# Patient Record
Sex: MALE | Race: WHITE | NOT HISPANIC OR LATINO | Employment: FULL TIME | URBAN - NONMETROPOLITAN AREA
[De-identification: names, ages, dates, MRNs, and addresses within clinical notes are randomized per-mention and may not be internally consistent; named-entity substitution may affect disease eponyms.]

---

## 2018-06-25 ENCOUNTER — OFFICE VISIT (OUTPATIENT)
Dept: FAMILY MEDICINE CLINIC | Facility: CLINIC | Age: 33
End: 2018-06-25

## 2018-06-25 VITALS
WEIGHT: 238 LBS | HEART RATE: 91 BPM | BODY MASS INDEX: 36.07 KG/M2 | DIASTOLIC BLOOD PRESSURE: 90 MMHG | OXYGEN SATURATION: 98 % | TEMPERATURE: 98.6 F | HEIGHT: 68 IN | SYSTOLIC BLOOD PRESSURE: 130 MMHG

## 2018-06-25 DIAGNOSIS — M25.562 CHRONIC PAIN OF BOTH KNEES: ICD-10-CM

## 2018-06-25 DIAGNOSIS — M25.561 CHRONIC PAIN OF BOTH KNEES: ICD-10-CM

## 2018-06-25 DIAGNOSIS — E66.09 CLASS 2 OBESITY DUE TO EXCESS CALORIES WITHOUT SERIOUS COMORBIDITY WITH BODY MASS INDEX (BMI) OF 36.0 TO 36.9 IN ADULT: ICD-10-CM

## 2018-06-25 DIAGNOSIS — G89.29 CHRONIC PAIN OF BOTH KNEES: ICD-10-CM

## 2018-06-25 DIAGNOSIS — Z00.00 ENCOUNTER FOR MEDICAL EXAMINATION TO ESTABLISH CARE: Primary | ICD-10-CM

## 2018-06-25 PROBLEM — E66.812 CLASS 2 OBESITY DUE TO EXCESS CALORIES WITHOUT SERIOUS COMORBIDITY WITH BODY MASS INDEX (BMI) OF 36.0 TO 36.9 IN ADULT: Status: ACTIVE | Noted: 2018-06-25

## 2018-06-25 PROCEDURE — 99203 OFFICE O/P NEW LOW 30 MIN: CPT | Performed by: NURSE PRACTITIONER

## 2018-06-25 RX ORDER — ACETAMINOPHEN 500 MG
500 TABLET ORAL EVERY 6 HOURS PRN
Qty: 90 TABLET | Refills: 5 | OUTPATIENT
Start: 2018-06-25 | End: 2019-03-16

## 2018-06-25 NOTE — PATIENT INSTRUCTIONS
Serving Sizes  A serving size is a measured amount of food or drink, such as one slice of bread, that has an associated nutrient content. Knowing the serving size of a food or drink can help you determine how much of that food you should consume.  What is the size of one serving?  The size of one healthy serving depends on the food or drink. To determine a serving size, read the food label. If the food or drink does not have a food label, try to find serving size information online. Or, use the following to estimate the size of one adult serving:  Grain  1 slice bread. ½ bagel. ½ cup pasta.  Vegetable  ½ cup cooked or canned vegetables. 1 cup raw, leafy greens.  Fruit  ½ cup canned fruit. 1 medium fruit. ¼ cup dried fruit.  Meat and Other Protein Sources  1 oz meat, poultry, or fish. ¼ cup cooked beans. 1 egg. ¼ cup nuts or seeds. 1 Tbsp nut butter. ¼ cup tofu or tempeh. 2 Tbsp hummus.  Dairy  An individual container of yogurt (6-8 oz). 1 piece of cheese the size of your thumb (1 oz). 1 cup (8 oz) milk or milk alternative.  Fat  A piece the size of one dice. 1 tsp soft margarine. 1 Tbsp mayonnaise. 1 tsp vegetable oil. 1 Tbsp regular salad dressing. 2 Tbsp low-fat salad dressing.  How many servings should I eat from each food group each day?  The following are the suggested number of servings to try and have every day from each food group. You can also look at your eating throughout the week and aim for meeting these requirements on most days for overall healthy eating.  Grain  6-8 servings. Try to have half of your grains from whole grains, such as whole wheat bread, corn tortillas, oatmeal, brown rice, whole wheat pasta, and bulgur.  Vegetable  At least 2½-3 servings.  Fruit  2 servings.  Meat and Other Protein Foods  5-6 servings. Aim to have lean proteins, such as chicken, turkey, fish, beans, or tofu.  Dairy  3 servings. Choose low-fat or nonfat if you are trying to control your weight.  Fat  2-3 servings.  Is  "a serving the same thing as a portion?  No. A portion is the actual amount you eat, which may be more than one serving. Knowing the specific serving size of a food and the nutritional information that goes with it can help you make a healthy decision on what size portion to eat.  What are some tips to help me learn healthy serving sizes?  · Check food labels for serving sizes. Many foods that come as a single portion actually contain multiple servings.  · Determine the serving size of foods you commonly eat and figure out how large a portion you usually eat.  · Measure the number of servings that can be held by the bowls, glasses, cups, and plates you typically use. For example, pour your breakfast cereal into your regular bowl and then pour it into a measuring cup.  · For 1-2 days, measure the serving sizes of all the foods you eat.  · Practice estimating serving sizes and determining how big your portions should be.  This information is not intended to replace advice given to you by your health care provider. Make sure you discuss any questions you have with your health care provider.  Document Released: 09/15/2004 Document Revised: 08/12/2017 Document Reviewed: 03/17/2015  [employer] Interactive Patient Education © 2018 [employer]  Eating Healthy on a Budget  There are many ways to save money at the grocery store and continue to eat healthy. You can be successful if you plan your meals according to your budget, purchase according to your budget and grocery list, and prepare food yourself.  How can I buy more food on a limited budget?  Plan  · Plan meals and snacks according to a grocery list and budget you create.  · Look for recipes where you can cook once and make enough food for two meals.  · Include meals that will \"stretch\" more expensive foods such as stews, casseroles, and stir-ny dishes.  · Make a grocery list and make sure to bring it with you to the store. If you have a smart phone, you could use your " "phone to create your shopping list.  Purchase  · When grocery shopping, buy only the items on your grocery list and go only to the areas of the store that have the items on your list.  Prepare  · Some meal items can be prepared in advance. Pre-cook on days when you have extra time.  · Make extra food (such as by doubling recipes) and freeze the extras in meal-sized containers or in individual portions for fast meals and snacks.  · Use leftovers in your meal plan for the week.  · Try some meatless meals or try \"no cook\" meals like salads.  · When you come home from the grocery store, wash and prepare your fruits and vegetables so they are ready to use and eat. This will help reduce food waste.  How can I buy more food on a limited budget?  Try these tips the next time you go shopping:  · Buy store brands or generic brands.  · Use coupons only for foods and brands you normally buy. Avoid buying items you wouldn't normally buy simply because they are on sale.  · Check online and in newspapers for weekly deals.  · Buy healthy items from the bulk bins when available, such as herbs, spices, flours, pastas, nuts, and dried fruit.  · Buy fruits and vegetables that are in season. Prices are usually lower on in-season produce.  · Compare and contrast different items. You can do this by looking at the unit price on the price tag. Use it to compare different brands and sizes to find out which item is the best deal.  · Choose naturally low-cost healthy items, such as carrots, potatoes, apples, bananas, and oranges. Dried or canned beans are a low-cost protein source.  · Buy in bulk and freeze extra food. Items you can buy in bulk include meats, fish, poultry, frozen fruits, and frozen vegetables.  · Limit the purchase of prepared or \"ready-to-eat\" foods, such as pre-cut fruits and vegetables and pre-made salads.  · If possible, shop around to discover which grocery store offers the best prices. Some stores charge much more than " "other stores for the same items.  · Do not shop when you are hungry. If you shop while hungry, It may be hard to stick to your list and budget.  · Stick to your list and resist impulse buys. Treat your list as your official plan for the week.  · Buy a variety of vegetables and fruit by purchasing fresh, frozen, and canned items.  · Look beyond eye level. Foods at eye level (adult or child eye level) are more expensive. Look at the top and bottom shelves for deals.  · Be efficient with your time when shopping. The more time you spend at the store, the more money you are likely to spend.  · Consider other retailers such as dollar stores, larger wholesale stores, local fruit and vegetable stands, and farmers markets.    What are some tips for less expensive food substitutions?  When choosing more expensive foods like meats and dairy, try these tips to save money:  · Choose cheaper cuts of meat, such as bone-in chicken thighs and drumsticks instead skinless and boneless chicken. When you are ready to prepare the chicken, you can remove the skin yourself to make it healthier.  · Choose lean meats like chicken or turkey. When choosing ground beef, make sure it is lean ground beef (92% lean, 8% fat). If you do buy a fattier ground beef, drain the fat before eating.  · Buy dried beans and peas, such as lentils, split peas, or kidney beans.  · For seafood, choose canned tuna, salmon, or sardines.  · Eggs are a low-cost source of protein.  · Buy the larger tubs of yogurt instead of individual-sized containers.  · Choose water instead of sodas and other sweetened beverages.  · Skip buying chips, cookies, and other \"junk food\". These items are usually expensive, high in calories, and low in nutritional value.    How can I prepare the foods I buy in the healthiest way?  Practice these tips for cooking foods in the healthiest way to reduce excess fat and calorie intake:  · Steam, saute, grill, or bake foods instead of frying " them.  · Make sure half your plate is filled with fruits or vegetables. Choose from fresh, frozen, or canned fruits and vegetables. If eating canned, remember to rinse them before eating. This will remove any excess salt added for packaging.  · Trim all fat from meat before cooking. Remove the skin from chicken or turkey.  · Spoon off fat from meat dishes once they have been chilled in the refrigerator and the fat has hardened on the top.  · Use skim milk, low-fat milk, or evaporated skim milk when making cream sauces, soups, or puddings.  · Substitute low-fat yogurt, sour cream, or cottage cheese for sour cream and mayonnaise in dips and dressings.  · Try lemon juice, herbs, or spices to season food instead of salt, butter, or margarine.    This information is not intended to replace advice given to you by your health care provider. Make sure you discuss any questions you have with your health care provider.  Document Released: 08/21/2015 Document Revised: 07/07/2017 Document Reviewed: 07/21/2015  [employer] Interactive Patient Education © 2018 [employer]  Steps to Quit Smoking  Smoking tobacco can be harmful to your health and can affect almost every organ in your body. Smoking puts you, and those around you, at risk for developing many serious chronic diseases. Quitting smoking is difficult, but it is one of the best things that you can do for your health. It is never too late to quit.  What are the benefits of quitting smoking?  When you quit smoking, you lower your risk of developing serious diseases and conditions, such as:  · Lung cancer or lung disease, such as COPD.  · Heart disease.  · Stroke.  · Heart attack.  · Infertility.  · Osteoporosis and bone fractures.    Additionally, symptoms such as coughing, wheezing, and shortness of breath may get better when you quit. You may also find that you get sick less often because your body is stronger at fighting off colds and infections. If you are pregnant,  quitting smoking can help to reduce your chances of having a baby of low birth weight.  How do I get ready to quit?  When you decide to quit smoking, create a plan to make sure that you are successful. Before you quit:  · Pick a date to quit. Set a date within the next two weeks to give you time to prepare.  · Write down the reasons why you are quitting. Keep this list in places where you will see it often, such as on your bathroom mirror or in your car or wallet.  · Identify the people, places, things, and activities that make you want to smoke (triggers) and avoid them. Make sure to take these actions:  ? Throw away all cigarettes at home, at work, and in your car.  ? Throw away smoking accessories, such as ashtrays and lighters.  ? Clean your car and make sure to empty the ashtray.  ? Clean your home, including curtains and carpets.  · Tell your family, friends, and coworkers that you are quitting. Support from your loved ones can make quitting easier.  · Talk with your health care provider about your options for quitting smoking.  · Find out what treatment options are covered by your health insurance.    What strategies can I use to quit smoking?  Talk with your healthcare provider about different strategies to quit smoking. Some strategies include:  · Quitting smoking altogether instead of gradually lessening how much you smoke over a period of time. Research shows that quitting “cold turkey” is more successful than gradually quitting.  · Attending in-person counseling to help you build problem-solving skills. You are more likely to have success in quitting if you attend several counseling sessions. Even short sessions of 10 minutes can be effective.  · Finding resources and support systems that can help you to quit smoking and remain smoke-free after you quit. These resources are most helpful when you use them often. They can include:  ? Online chats with a counselor.  ? Telephone quitlines.  ? Printed  self-help materials.  ? Support groups or group counseling.  ? Text messaging programs.  ? Mobile phone applications.  · Taking medicines to help you quit smoking. (If you are pregnant or breastfeeding, talk with your health care provider first.) Some medicines contain nicotine and some do not. Both types of medicines help with cravings, but the medicines that include nicotine help to relieve withdrawal symptoms. Your health care provider may recommend:  ? Nicotine patches, gum, or lozenges.  ? Nicotine inhalers or sprays.  ? Non-nicotine medicine that is taken by mouth.    Talk with your health care provider about combining strategies, such as taking medicines while you are also receiving in-person counseling. Using these two strategies together makes you more likely to succeed in quitting than if you used either strategy on its own.  If you are pregnant or breastfeeding, talk with your health care provider about finding counseling or other support strategies to quit smoking. Do not take medicine to help you quit smoking unless told to do so by your health care provider.  What things can I do to make it easier to quit?  Quitting smoking might feel overwhelming at first, but there is a lot that you can do to make it easier. Take these important actions:  · Reach out to your family and friends and ask that they support and encourage you during this time. Call telephone quitlines, reach out to support groups, or work with a counselor for support.  · Ask people who smoke to avoid smoking around you.  · Avoid places that trigger you to smoke, such as bars, parties, or smoke-break areas at work.  · Spend time around people who do not smoke.  · Lessen stress in your life, because stress can be a smoking trigger for some people. To lessen stress, try:  ? Exercising regularly.  ? Deep-breathing exercises.  ? Yoga.  ? Meditating.  ? Performing a body scan. This involves closing your eyes, scanning your body from head to toe,  and noticing which parts of your body are particularly tense. Purposefully relax the muscles in those areas.  · Download or purchase mobile phone or tablet apps (applications) that can help you stick to your quit plan by providing reminders, tips, and encouragement. There are many free apps, such as QuitGuide from the CDC (Centers for Disease Control and Prevention). You can find other support for quitting smoking (smoking cessation) through smokefree.gov and other websites.    How will I feel when I quit smoking?  Within the first 24 hours of quitting smoking, you may start to feel some withdrawal symptoms. These symptoms are usually most noticeable 2-3 days after quitting, but they usually do not last beyond 2-3 weeks. Changes or symptoms that you might experience include:  · Mood swings.  · Restlessness, anxiety, or irritation.  · Difficulty concentrating.  · Dizziness.  · Strong cravings for sugary foods in addition to nicotine.  · Mild weight gain.  · Constipation.  · Nausea.  · Coughing or a sore throat.  · Changes in how your medicines work in your body.  · A depressed mood.  · Difficulty sleeping (insomnia).    After the first 2-3 weeks of quitting, you may start to notice more positive results, such as:  · Improved sense of smell and taste.  · Decreased coughing and sore throat.  · Slower heart rate.  · Lower blood pressure.  · Clearer skin.  · The ability to breathe more easily.  · Fewer sick days.    Quitting smoking is very challenging for most people. Do not get discouraged if you are not successful the first time. Some people need to make many attempts to quit before they achieve long-term success. Do your best to stick to your quit plan, and talk with your health care provider if you have any questions or concerns.  This information is not intended to replace advice given to you by your health care provider. Make sure you discuss any questions you have with your health care provider.  Document  Released: 12/12/2002 Document Revised: 08/15/2017 Document Reviewed: 05/03/2016  [employer] Interactive Patient Education © 2018 [employer]

## 2018-06-25 NOTE — PROGRESS NOTES
"Cristine Mar is a 32 y.o. male.     Chief Complaint   Patient presents with   • Knee Pain       History of Present Illness     Establish Care-Here to establish primary care. No recent primary care.    Heart attack-in 2009.  Was taken care of at [facility].  Reports was \"mild\".  He was followed by Cardio for a short time but has not had any recent care.  \"slight damage to backside of heart\".    Knee pain-Intermittent knee edema and some subluxation of patella bilaterally.  Sitting or standing for prolonged periods are uncomfortable.  He reports he has not noted any rubbing of his joint.  He reports he played foot ball for multiple years and baseball.  Last imaging was \"senior in high school\".  He reports he does report \"blew out the side of my knee\" on the right.  Does take PRN Tylenol.  He has not worn braces for support.  He does use heat and ice PRN.  He does try gentle stretching.  Ongoing.     The following portions of the patient's history were reviewed and updated as appropriate: allergies, current medications, past family history, past medical history, past social history, past surgical history and problem list.    Review of Systems   Constitutional: Negative for appetite change, fatigue and unexpected weight change.   HENT: Positive for congestion. Negative for ear pain, nosebleeds, postnasal drip, rhinorrhea, sore throat, trouble swallowing and voice change.    Eyes: Negative for pain and visual disturbance.   Respiratory: Negative for cough, shortness of breath and wheezing.    Cardiovascular: Negative for chest pain, palpitations and leg swelling.   Gastrointestinal: Negative for abdominal pain, blood in stool, constipation and diarrhea.   Endocrine: Negative for cold intolerance and polydipsia.   Genitourinary: Negative for difficulty urinating, flank pain and hematuria.   Musculoskeletal: Positive for arthralgias and joint swelling (knees with acute flares). Negative for back " "pain, gait problem and myalgias.   Skin: Negative for color change and rash.   Allergic/Immunologic: Negative.    Neurological: Negative for dizziness, syncope, numbness and headaches.   Hematological: Negative.    Psychiatric/Behavioral: Negative for sleep disturbance and suicidal ideas.   All other systems reviewed and are negative.      Objective     /90   Pulse 91   Temp 98.6 °F (37 °C) (Temporal Artery )   Ht 172.7 cm (68\")   Wt 108 kg (238 lb)   SpO2 98%   BMI 36.19 kg/m²     Physical Exam   Constitutional: He is oriented to person, place, and time. He appears well-developed and well-nourished. No distress.   HENT:   Head: Normocephalic and atraumatic.   Right Ear: Hearing, tympanic membrane, external ear and ear canal normal.   Left Ear: Hearing, tympanic membrane, external ear and ear canal normal.   Nose: No mucosal edema or rhinorrhea.   Mouth/Throat: Oropharynx is clear and moist and mucous membranes are normal.   Eyes: Conjunctivae, EOM and lids are normal. Pupils are equal, round, and reactive to light. No scleral icterus. Right eye exhibits normal extraocular motion and no nystagmus. Left eye exhibits normal extraocular motion and no nystagmus.   Neck: Normal range of motion. Neck supple. No JVD present. No tracheal tenderness present. Carotid bruit is not present. No thyromegaly present.   Cardiovascular: Normal rate, regular rhythm, S1 normal, S2 normal, normal heart sounds and intact distal pulses.    No murmur heard.  Pulmonary/Chest: Effort normal and breath sounds normal. He exhibits no tenderness.   Abdominal: Soft. Bowel sounds are normal. He exhibits no mass. There is no hepatosplenomegaly. There is no tenderness.   Musculoskeletal: Normal range of motion. He exhibits no edema.        Right knee: He exhibits bony tenderness. Tenderness found. Patellar tendon tenderness noted.        Left knee: He exhibits bony tenderness. Tenderness found. Patellar tendon tenderness noted.   Gait " normal.   equal bilaterally. No muscular atrophy or flaccidity.   Lymphadenopathy:     He has no cervical adenopathy.        Right cervical: No superficial cervical adenopathy present.       Left cervical: No superficial cervical adenopathy present.   Neurological: He is alert and oriented to person, place, and time. He has normal strength and normal reflexes. He displays no tremor. No cranial nerve deficit or sensory deficit. He exhibits normal muscle tone. Coordination and gait normal.   Skin: Skin is warm and dry. Capillary refill takes less than 2 seconds. He is not diaphoretic. No cyanosis. No pallor. Nails show no clubbing.   various tattoos   Psychiatric: He has a normal mood and affect. His speech is normal and behavior is normal. Judgment and thought content normal. He is not actively hallucinating. Cognition and memory are normal. He is attentive.   Vitals reviewed.    Assessment/Plan     Problem List Items Addressed This Visit        Digestive    Class 2 obesity due to excess calories without serious comorbidity with body mass index (BMI) of 36.0 to 36.9 in adult       Musculoskeletal and Integument    Chronic pain of both knees    Relevant Medications    acetaminophen (TYLENOL) 500 MG tablet    Other Relevant Orders    XR Knee 3 View Bilateral    Hinged Knee Brace- Right and Left      Other Visit Diagnoses     Encounter for medical examination to establish care    -  Primary    H & P obtained          Patient's Body mass index is 36.19 kg/m². BMI is above normal parameters. Recommendations include: nutrition counseling.    Normal BMI:  18.5-24.9, Overweight 25-29.9, Obesity 30 or greater  I advised León of the risks of continuing to use tobacco, and I provided him with tobacco cessation educational materials in the After Visit Summary.     During this visit, I spent 2-3 minutes counseling the patient regarding tobacco cessation.  Will request his past records from [facility].  Continue  Tylenol as ordered for his joint pain.    RTC 1 month, sooner if needed.           This document has been electronically signed by:  ARTUR Brothers, LILIANA

## 2018-07-03 ENCOUNTER — HOSPITAL ENCOUNTER (OUTPATIENT)
Dept: GENERAL RADIOLOGY | Facility: HOSPITAL | Age: 33
Discharge: HOME | End: 2018-07-03

## 2018-07-03 DIAGNOSIS — M25.562 CHRONIC PAIN OF BOTH KNEES: ICD-10-CM

## 2018-07-03 DIAGNOSIS — M25.561 CHRONIC PAIN OF BOTH KNEES: ICD-10-CM

## 2018-07-03 DIAGNOSIS — G89.29 CHRONIC PAIN OF BOTH KNEES: ICD-10-CM

## 2018-08-28 ENCOUNTER — OFFICE VISIT (OUTPATIENT)
Dept: FAMILY MEDICINE CLINIC | Facility: CLINIC | Age: 33
End: 2018-08-28

## 2018-08-28 VITALS
BODY MASS INDEX: 35.74 KG/M2 | HEART RATE: 115 BPM | OXYGEN SATURATION: 98 % | HEIGHT: 68 IN | SYSTOLIC BLOOD PRESSURE: 130 MMHG | WEIGHT: 235.8 LBS | DIASTOLIC BLOOD PRESSURE: 90 MMHG

## 2018-08-28 DIAGNOSIS — M25.561 CHRONIC PAIN OF BOTH KNEES: Primary | ICD-10-CM

## 2018-08-28 DIAGNOSIS — M25.562 CHRONIC PAIN OF BOTH KNEES: Primary | ICD-10-CM

## 2018-08-28 DIAGNOSIS — G89.29 CHRONIC PAIN OF BOTH KNEES: Primary | ICD-10-CM

## 2018-08-28 PROCEDURE — 99213 OFFICE O/P EST LOW 20 MIN: CPT | Performed by: NURSE PRACTITIONER

## 2019-02-15 ENCOUNTER — OFFICE VISIT (OUTPATIENT)
Dept: FAMILY MEDICINE CLINIC | Facility: CLINIC | Age: 34
End: 2019-02-15

## 2019-02-15 VITALS
HEART RATE: 99 BPM | BODY MASS INDEX: 36.22 KG/M2 | HEIGHT: 68 IN | DIASTOLIC BLOOD PRESSURE: 76 MMHG | SYSTOLIC BLOOD PRESSURE: 124 MMHG | OXYGEN SATURATION: 97 % | WEIGHT: 239 LBS | TEMPERATURE: 98.1 F

## 2019-02-15 DIAGNOSIS — J40 BRONCHITIS: ICD-10-CM

## 2019-02-15 DIAGNOSIS — R50.81 FEVER IN OTHER DISEASES: Primary | ICD-10-CM

## 2019-02-15 LAB
EXPIRATION DATE: NORMAL
FLUAV AG NPH QL: NEGATIVE
FLUBV AG NPH QL: NEGATIVE
INTERNAL CONTROL: NORMAL
Lab: NORMAL

## 2019-02-15 PROCEDURE — 87804 INFLUENZA ASSAY W/OPTIC: CPT | Performed by: PHYSICIAN ASSISTANT

## 2019-02-15 PROCEDURE — 99213 OFFICE O/P EST LOW 20 MIN: CPT | Performed by: PHYSICIAN ASSISTANT

## 2019-02-15 RX ORDER — AZITHROMYCIN 250 MG/1
TABLET, FILM COATED ORAL
Qty: 6 TABLET | Refills: 0 | OUTPATIENT
Start: 2019-02-15 | End: 2019-03-16

## 2019-02-18 NOTE — PROGRESS NOTES
"Cristine Mar is a 33 y.o. male.       Chief Complaint -  fever    History of Present Illness -       Fever-  He complains of fever 100.9 degrees Fahrenheit, productive cough, and fatigue for the past 3 days.  Minimal relief with Tylenol.    Influenza testing today was negative in the office    The following portions of the patient's history were reviewed and updated as appropriate: allergies, current medications, past family history, past medical history, past social history, past surgical history and problem list.    Review of Systems   Constitutional: Positive for fatigue and fever. Negative for activity change and appetite change.   HENT: Negative for ear pain, sinus pressure and sore throat.    Eyes: Negative for pain and visual disturbance.   Respiratory: Positive for cough. Negative for chest tightness.    Cardiovascular: Negative for chest pain and palpitations.   Gastrointestinal: Negative for abdominal pain, constipation, diarrhea, nausea and vomiting.   Endocrine: Negative for polydipsia and polyuria.   Genitourinary: Negative for dysuria and frequency.   Musculoskeletal: Negative for back pain and myalgias.   Skin: Negative for color change and rash.   Allergic/Immunologic: Negative for food allergies and immunocompromised state.   Neurological: Negative for dizziness, syncope and headaches.   Hematological: Negative for adenopathy. Does not bruise/bleed easily.   Psychiatric/Behavioral: Negative for hallucinations and suicidal ideas. The patient is not nervous/anxious.        /76   Pulse 99   Temp 98.1 °F (36.7 °C) (Oral)   Ht 172.7 cm (67.99\")   Wt 108 kg (239 lb)   SpO2 97%   BMI 36.35 kg/m²     Physical Exam   Constitutional: He is oriented to person, place, and time. He appears well-developed and well-nourished. No distress.   HENT:   Head: Normocephalic and atraumatic.   Right Ear: Tympanic membrane, external ear and ear canal normal.   Left Ear: Tympanic membrane, external " ear and ear canal normal.   Mouth/Throat: Oropharynx is clear and moist.   Neck: Normal range of motion. Neck supple. No thyromegaly present.   Cardiovascular: Normal rate and regular rhythm.   No murmur heard.  Pulmonary/Chest: Effort normal and breath sounds normal. He has no wheezes. He has no rales.   Neurological: He is alert and oriented to person, place, and time.   Skin: Skin is warm and dry.   Psychiatric: He has a normal mood and affect. His behavior is normal.       Assessment/Plan     Diagnoses and all orders for this visit:    Fever in other diseases  -     POCT Influenza A/B    Bronchitis  -     azithromycin (ZITHROMAX) 250 MG tablet; Take 2 tablets the first day, then 1 tablet daily for 4 days.      Symptomatic care also advised.  He was advised to return to clinic if symptoms persist or worsen.           This document has been electronically signed by:  Nafisa No PA-C

## 2019-03-16 ENCOUNTER — HOSPITAL ENCOUNTER (EMERGENCY)
Facility: HOSPITAL | Age: 34
Discharge: HOME | End: 2019-03-16
Attending: FAMILY MEDICINE | Admitting: FAMILY MEDICINE

## 2019-03-16 VITALS
TEMPERATURE: 98.1 F | DIASTOLIC BLOOD PRESSURE: 76 MMHG | SYSTOLIC BLOOD PRESSURE: 128 MMHG | WEIGHT: 230 LBS | RESPIRATION RATE: 18 BRPM | HEIGHT: 67 IN | BODY MASS INDEX: 36.1 KG/M2 | OXYGEN SATURATION: 97 % | HEART RATE: 87 BPM

## 2019-03-16 DIAGNOSIS — B34.9 VIRAL SYNDROME: Primary | ICD-10-CM

## 2019-03-16 LAB
FLUAV AG NPH QL: NEGATIVE
FLUBV AG NPH QL IA: NEGATIVE

## 2019-03-16 PROCEDURE — 87804 INFLUENZA ASSAY W/OPTIC: CPT | Performed by: FAMILY MEDICINE

## 2019-03-16 PROCEDURE — 99283 EMERGENCY DEPT VISIT LOW MDM: CPT

## 2019-03-16 NOTE — ED PROVIDER NOTES
Subjective     History provided by:  Patient   used: No    Flu Symptoms   Presenting symptoms: fever, headache and myalgias    Severity:  Moderate  Onset quality:  Sudden  Duration:  2 hours  Progression:  Worsening  Chronicity:  New  Relieved by:  Nothing  Worsened by:  Nothing  Ineffective treatments:  None tried  Associated symptoms: nasal congestion    Risk factors comment:  Exposure to influenza      Review of Systems   Constitutional: Positive for fever.   HENT: Positive for congestion.    Eyes: Negative.    Respiratory: Negative.    Cardiovascular: Negative.  Negative for leg swelling.   Gastrointestinal: Negative.  Negative for abdominal distention.   Endocrine: Negative.    Genitourinary: Negative.    Musculoskeletal: Positive for myalgias.   Skin: Negative.    Allergic/Immunologic: Negative.    Neurological: Positive for headaches.   Hematological: Negative.    Psychiatric/Behavioral: Negative.    All other systems reviewed and are negative.      Past Medical History:   Diagnosis Date   • Heart attack (CMS/[facility]) 2009       Allergies   Allergen Reactions   • Strawberry Extract Anaphylaxis       No past surgical history on file.    No family history on file.    Social History     Socioeconomic History   • Marital status: Single     Spouse name: Not on file   • Number of children: 1   • Years of education: 12   • Highest education level: Not on file   Social Needs   • Financial resource strain: Somewhat hard   • Food insecurity - worry: Never true   • Food insecurity - inability: Never true   • Transportation needs - medical: No   • Transportation needs - non-medical: No   Occupational History   • Occupation: [employer]   Tobacco Use   • Smoking status: Current Every Day Smoker     Packs/day: 0.50     Types: Cigarettes   • Smokeless tobacco: Never Used   Substance and Sexual Activity   • Alcohol use: No   • Drug use: No   • Sexual activity: Yes     Partners: Female            Objective   Physical Exam   Constitutional: He is oriented to person, place, and time. He appears well-developed and well-nourished.   HENT:   Head: Normocephalic and atraumatic.   Eyes: EOM are normal. Pupils are equal, round, and reactive to light.   Neck: Normal range of motion. Neck supple.   Cardiovascular: Normal rate, regular rhythm, normal heart sounds and intact distal pulses.   Pulmonary/Chest: Effort normal and breath sounds normal.   Abdominal: Soft. Bowel sounds are normal.   Musculoskeletal: Normal range of motion.   Neurological: He is alert and oriented to person, place, and time.   Skin: Skin is warm and dry. Capillary refill takes less than 2 seconds.   Psychiatric: He has a normal mood and affect. His behavior is normal. Judgment and thought content normal.   Nursing note and vitals reviewed.      Procedures           ED Course                  MDM  Number of Diagnoses or Management Options  Viral syndrome: new and requires workup     Amount and/or Complexity of Data Reviewed  Clinical lab tests: ordered and reviewed  Tests in the medicine section of CPT®: reviewed and ordered    Risk of Complications, Morbidity, and/or Mortality  Presenting problems: moderate  Diagnostic procedures: moderate  Management options: moderate    Patient Progress  Patient progress: improved        Final diagnoses:   Viral syndrome            Linsey Stone, APRN  03/16/19 0449

## 2019-05-29 ENCOUNTER — CLINICAL SUPPORT (OUTPATIENT)
Dept: FAMILY MEDICINE CLINIC | Facility: CLINIC | Age: 34
End: 2019-05-29

## 2019-05-29 PROCEDURE — 90471 IMMUNIZATION ADMIN: CPT | Performed by: PHYSICIAN ASSISTANT

## 2019-05-29 PROCEDURE — 90632 HEPA VACCINE ADULT IM: CPT | Performed by: PHYSICIAN ASSISTANT

## 2019-12-10 ENCOUNTER — HOSPITAL ENCOUNTER (OUTPATIENT)
Dept: CT IMAGING | Facility: HOSPITAL | Age: 34
Discharge: HOME | End: 2019-12-10
Admitting: NURSE PRACTITIONER

## 2019-12-10 ENCOUNTER — OFFICE VISIT (OUTPATIENT)
Dept: FAMILY MEDICINE CLINIC | Facility: CLINIC | Age: 34
End: 2019-12-10

## 2019-12-10 VITALS
SYSTOLIC BLOOD PRESSURE: 142 MMHG | BODY MASS INDEX: 38.77 KG/M2 | TEMPERATURE: 97.8 F | WEIGHT: 247 LBS | HEIGHT: 67 IN | HEART RATE: 106 BPM | OXYGEN SATURATION: 92 % | DIASTOLIC BLOOD PRESSURE: 82 MMHG

## 2019-12-10 DIAGNOSIS — R11.2 NAUSEA AND VOMITING IN ADULT PATIENT: ICD-10-CM

## 2019-12-10 DIAGNOSIS — R10.31 ABDOMINAL PAIN, RLQ: Primary | ICD-10-CM

## 2019-12-10 PROCEDURE — 74176 CT ABD & PELVIS W/O CONTRAST: CPT | Performed by: RADIOLOGY

## 2019-12-10 PROCEDURE — 74176 CT ABD & PELVIS W/O CONTRAST: CPT

## 2019-12-10 PROCEDURE — 99213 OFFICE O/P EST LOW 20 MIN: CPT | Performed by: NURSE PRACTITIONER

## 2019-12-10 RX ORDER — ONDANSETRON 4 MG/1
4 TABLET, FILM COATED ORAL EVERY 8 HOURS PRN
Qty: 30 TABLET | Refills: 1 | Status: SHIPPED | OUTPATIENT
Start: 2019-12-10 | End: 2020-01-29

## 2019-12-10 RX ORDER — PROMETHAZINE HYDROCHLORIDE 25 MG/1
25 TABLET ORAL EVERY 6 HOURS PRN
Qty: 30 TABLET | Refills: 1 | Status: SHIPPED | OUTPATIENT
Start: 2019-12-10 | End: 2020-01-28

## 2019-12-10 NOTE — PROGRESS NOTES
Please let him know he does not have appendicitis.  His gallbladder actually looks normal on this scan.  He is noted to have some mild constipation and a small right inguinal hernia which could be causing some of his pain.

## 2019-12-10 NOTE — PROGRESS NOTES
"Cristine Mar is a 34 y.o. male.     Chief Complaint   Patient presents with   • Vomiting   • Diarrhea   • Abdominal Pain       History of Present Illness     GI complaint-patient is here today for acute abdominal pain, nausea, vomiting, and diarrhea.  He reports symptoms have been present for 3 to 4 days.  He reports he has been seen at urgent care last week.  Pain is present in his RLQ.  He reports \"hurts really bad\".  He reports any jarring of his abdomen is painful.  He reports \"green frothy vomit\" that \"smells horrible\".  He reports he did have a fever on Friday when he went to urgent care of \"102\" with elevation of BP.  He reports walking hurts and sitting with pressure on his abdomen hurts.      The following portions of the patient's history were reviewed and updated as appropriate: CC, ROS, allergies, current medications, past family history, past medical history, past social history, past surgical history and problem list.    Review of Systems   Constitutional: Positive for appetite change and fatigue. Negative for unexpected weight change.   HENT: Positive for congestion, sinus pressure, sinus pain and sore throat. Negative for ear pain, nosebleeds, postnasal drip, rhinorrhea, trouble swallowing and voice change.    Eyes: Negative for pain and visual disturbance.   Respiratory: Positive for cough. Negative for shortness of breath and wheezing.    Cardiovascular: Negative for chest pain and palpitations.   Gastrointestinal: Positive for abdominal pain, diarrhea, nausea and vomiting. Negative for blood in stool and constipation.   Endocrine: Negative for cold intolerance and polydipsia.   Genitourinary: Negative for difficulty urinating, flank pain and hematuria.   Musculoskeletal: Negative for arthralgias, back pain, gait problem, joint swelling and myalgias.   Skin: Negative for color change and rash.   Allergic/Immunologic: Negative.    Neurological: Positive for dizziness and headaches. " "Negative for syncope and numbness.   Hematological: Negative.    Psychiatric/Behavioral: Negative for sleep disturbance and suicidal ideas.   All other systems reviewed and are negative.      Objective     /82   Pulse 106   Temp 97.8 °F (36.6 °C) (Temporal)   Ht 170.2 cm (67\")   Wt 112 kg (247 lb)   SpO2 92%   BMI 38.69 kg/m²     Physical Exam   Constitutional: He is oriented to person, place, and time. He appears well-developed and well-nourished. No distress.   HENT:   Head: Normocephalic and atraumatic.   Right Ear: External ear normal.   Left Ear: External ear normal.   Nose: Nose normal.   Mouth/Throat: Oropharynx is clear and moist.   Eyes: Pupils are equal, round, and reactive to light. EOM are normal. No scleral icterus.   Neck: Normal range of motion. Neck supple. No JVD present. No thyromegaly present.   Cardiovascular: Normal rate, regular rhythm and normal heart sounds.   Pulmonary/Chest: Effort normal and breath sounds normal.   Abdominal: Soft. Bowel sounds are normal. There is no hepatosplenomegaly. There is tenderness in the right lower quadrant. There is no CVA tenderness.   Positive psoas sign and right heel jar   Neurological: He is alert and oriented to person, place, and time. No cranial nerve deficit. Coordination normal.   Skin: Skin is warm and dry.   Psychiatric: He has a normal mood and affect. His behavior is normal. Judgment and thought content normal.   Vitals reviewed.      Assessment/Plan     Problem List Items Addressed This Visit     None      Visit Diagnoses     Abdominal pain, RLQ    -  Primary    Relevant Orders    CT Abdomen Pelvis Without Contrast    Nausea and vomiting in adult patient        Relevant Medications    ondansetron (ZOFRAN) 4 MG tablet    promethazine (PHENERGAN) 25 MG tablet          Patient's Body mass index is 38.69 kg/m². BMI is above normal parameters. Recommendations include: nutrition counseling.     Emotional support and active listening " provided.  Patient provided time to verbalize feelings.  Understands disease processes and need for medications.  Understands reasons for urgent and emergent care.  Patient (& family) verbalized agreement for treatment plan    Stat CT ordered of abdomen and pelvis.  Approved with insurance and patient sent directly to the hospital to have scan done.  PRN meds for nausea.    RTC 3 to 5 days, sooner if needed for problems or concerns          This document has been electronically signed by:  ARTUR Brothers, FNP-C    Dragon disclaimer:  Much of this encounter note is an electronic transcription/translation of spoken language to printed text. The electronic translation of spoken language may permit erroneous, or at times, nonsensical words or phrases to be inadvertently transcribed; Although I have reviewed the note for such errors, some may still exist.

## 2020-01-21 ENCOUNTER — OFFICE VISIT (OUTPATIENT)
Dept: FAMILY MEDICINE CLINIC | Facility: CLINIC | Age: 35
End: 2020-01-21

## 2020-01-21 VITALS
BODY MASS INDEX: 38.77 KG/M2 | HEART RATE: 100 BPM | DIASTOLIC BLOOD PRESSURE: 80 MMHG | TEMPERATURE: 98 F | OXYGEN SATURATION: 98 % | HEIGHT: 67 IN | WEIGHT: 247 LBS | SYSTOLIC BLOOD PRESSURE: 130 MMHG

## 2020-01-21 DIAGNOSIS — Z30.09 VASECTOMY EVALUATION: Primary | ICD-10-CM

## 2020-01-21 DIAGNOSIS — M25.561 CHRONIC PAIN OF BOTH KNEES: ICD-10-CM

## 2020-01-21 DIAGNOSIS — M25.562 CHRONIC PAIN OF BOTH KNEES: ICD-10-CM

## 2020-01-21 DIAGNOSIS — E66.09 CLASS 2 OBESITY DUE TO EXCESS CALORIES WITHOUT SERIOUS COMORBIDITY WITH BODY MASS INDEX (BMI) OF 38.0 TO 38.9 IN ADULT: ICD-10-CM

## 2020-01-21 DIAGNOSIS — G89.29 CHRONIC PAIN OF BOTH KNEES: ICD-10-CM

## 2020-01-21 PROCEDURE — 99214 OFFICE O/P EST MOD 30 MIN: CPT | Performed by: NURSE PRACTITIONER

## 2020-01-21 NOTE — ASSESSMENT & PLAN NOTE
Avoid overuse activities such as walking prolonged distances or climbing steps.  Braces PRN for support.  PRN heat and ice

## 2020-01-21 NOTE — PROGRESS NOTES
"Cristine Mar is a 34 y.o. male.     Chief Complaint   Patient presents with   • Referral for vasectomy       History of Present Illness     Urology referral-here for a referral for vasectomy referral.  His significant other has recently had a baby girl and \"I have 3 kids\".  The plan was for his SO to have BTL but she has so much \"scar tissue\" that she is a poor surgical candidate.  She will have to have another appt for internal healing check upcoming.  He has attempted to make an appt but \"needed a referral\".    Knee pain-chronic and ongoing.  More painful after standing or being at work.  He has braces but does not wear them daily as they get to hot and 'stink\".  He has grinding sensation in his knees.   He has not been evaluated for synvis.  Not at goal  Fatty liver and gastritis-found per GI.  He reports he is presently undergoing a GB work up.  He will be having a HIDA scan.  He is presently under the care of Dr Silverio at [facility].  He has been advised to work on weight reduction.  Ongoing.      The following portions of the patient's history were reviewed and updated as appropriate: CC, ROS, allergies, current medications, past family history, past medical history, past social history, past surgical history and problem list.      Review of Systems   Constitutional: Negative for appetite change, fatigue and unexpected weight change.   HENT: Negative for congestion, ear pain, nosebleeds, postnasal drip, rhinorrhea, sore throat, trouble swallowing and voice change.    Eyes: Negative for pain and visual disturbance.   Respiratory: Negative for cough, shortness of breath and wheezing.    Cardiovascular: Negative for chest pain and palpitations.   Gastrointestinal: Negative for abdominal pain, blood in stool, constipation and diarrhea.   Endocrine: Negative for cold intolerance and polydipsia.   Genitourinary: Negative for difficulty urinating, flank pain and hematuria.   Musculoskeletal: Negative for " "arthralgias, back pain, gait problem, joint swelling and myalgias.   Skin: Negative for color change and rash.   Allergic/Immunologic: Negative.    Neurological: Negative for syncope, numbness and headaches.   Hematological: Negative.    Psychiatric/Behavioral: Negative for sleep disturbance and suicidal ideas.   All other systems reviewed and are negative.      Objective     /80   Pulse 100   Temp 98 °F (36.7 °C) (Temporal)   Ht 170.2 cm (67\")   Wt 112 kg (247 lb)   SpO2 98%   BMI 38.69 kg/m²     Physical Exam   Constitutional: He is oriented to person, place, and time. He appears well-developed and well-nourished. He is cooperative.   HENT:   Head: Normocephalic and atraumatic.   Right Ear: External ear normal.   Left Ear: External ear normal.   Nose: Nose normal.   Mouth/Throat: Oropharynx is clear and moist. No oropharyngeal exudate.   Eyes: Pupils are equal, round, and reactive to light. Conjunctivae and EOM are normal. No scleral icterus.   Neck: Normal range of motion. Neck supple.   Cardiovascular: Normal rate, regular rhythm, normal heart sounds and intact distal pulses.   Pulmonary/Chest: Effort normal and breath sounds normal. No respiratory distress.   Abdominal: Soft. Bowel sounds are normal.   Musculoskeletal: He exhibits tenderness. He exhibits no deformity.        Right knee: Tenderness found. Medial joint line and lateral joint line tenderness noted.        Left knee: Tenderness found. Medial joint line and lateral joint line tenderness noted.        Lumbar back: He exhibits decreased range of motion, tenderness, pain and spasm. He exhibits no edema and no deformity.   Bilateral knee crepitus   Neurological: He is alert and oriented to person, place, and time. No cranial nerve deficit. He exhibits normal muscle tone. Coordination normal.   Skin: Skin is warm and dry. No cyanosis. Nails show no clubbing.   Multiple tattoos on arms   Psychiatric: He has a normal mood and affect. His " behavior is normal. Judgment and thought content normal.   Vitals reviewed.      Assessment/Plan     Problem List Items Addressed This Visit        Digestive    Class 2 obesity due to excess calories without serious comorbidity with body mass index (BMI) of 38.0 to 38.9 in adult    Current Assessment & Plan     Dietary counseling provided:  Healthy food choices including fruits and vegetables, limit soda and junk foods, adequate water intake.  Increase in physical activity advised at least 30 minutes per day 3-5 days per week.              Musculoskeletal and Integument    Chronic pain of both knees    Current Assessment & Plan     Avoid overuse activities such as walking prolonged distances or climbing steps.  Braces PRN for support.  PRN heat and ice         Relevant Orders    Ambulatory Referral to Orthopedic Surgery      Other Visit Diagnoses     Vasectomy evaluation    -  Primary    Relevant Orders    Ambulatory Referral to Urology          Patient's Body mass index is 38.69 kg/m². BMI is above normal parameters. Recommendations include: nutrition counseling.    Referral as above to speciality.    We will plan for labs at time of patient's next appointment time  RTC 1 month, sooner if needed.             This document has been electronically signed by:  ARTUR Brothers, FNP-C    Dragon disclaimer:  Much of this encounter note is an electronic transcription/translation of spoken language to printed text. The electronic translation of spoken language may permit erroneous, or at times, nonsensical words or phrases to be inadvertently transcribed; Although I have reviewed the note for such errors, some may still exist.

## 2020-01-28 ENCOUNTER — OFFICE VISIT (OUTPATIENT)
Dept: FAMILY MEDICINE CLINIC | Facility: CLINIC | Age: 35
End: 2020-01-28

## 2020-01-28 VITALS
BODY MASS INDEX: 38.14 KG/M2 | TEMPERATURE: 98.9 F | RESPIRATION RATE: 18 BRPM | HEIGHT: 67 IN | WEIGHT: 243 LBS | DIASTOLIC BLOOD PRESSURE: 90 MMHG | HEART RATE: 113 BPM | SYSTOLIC BLOOD PRESSURE: 128 MMHG | OXYGEN SATURATION: 98 %

## 2020-01-28 DIAGNOSIS — M25.561 CHRONIC PAIN OF BOTH KNEES: Primary | ICD-10-CM

## 2020-01-28 DIAGNOSIS — E66.09 CLASS 2 OBESITY DUE TO EXCESS CALORIES WITHOUT SERIOUS COMORBIDITY WITH BODY MASS INDEX (BMI) OF 38.0 TO 38.9 IN ADULT: ICD-10-CM

## 2020-01-28 DIAGNOSIS — G89.29 CHRONIC PAIN OF BOTH KNEES: Primary | ICD-10-CM

## 2020-01-28 DIAGNOSIS — M25.562 CHRONIC PAIN OF BOTH KNEES: Primary | ICD-10-CM

## 2020-01-28 PROCEDURE — 90471 IMMUNIZATION ADMIN: CPT | Performed by: PHYSICIAN ASSISTANT

## 2020-01-28 PROCEDURE — 99213 OFFICE O/P EST LOW 20 MIN: CPT | Performed by: PHYSICIAN ASSISTANT

## 2020-01-28 PROCEDURE — 90674 CCIIV4 VAC NO PRSV 0.5 ML IM: CPT | Performed by: PHYSICIAN ASSISTANT

## 2020-01-28 PROCEDURE — 20610 DRAIN/INJ JOINT/BURSA W/O US: CPT | Performed by: PHYSICIAN ASSISTANT

## 2020-01-29 RX ORDER — DEXAMETHASONE SODIUM PHOSPHATE 4 MG/ML
4 INJECTION, SOLUTION INTRA-ARTICULAR; INTRALESIONAL; INTRAMUSCULAR; INTRAVENOUS; SOFT TISSUE ONCE
Status: DISCONTINUED | OUTPATIENT
Start: 2020-01-29 | End: 2020-02-25

## 2020-01-29 RX ORDER — TRIAMCINOLONE ACETONIDE 40 MG/ML
40 INJECTION, SUSPENSION INTRA-ARTICULAR; INTRAMUSCULAR ONCE
Status: DISCONTINUED | OUTPATIENT
Start: 2020-01-29 | End: 2020-02-25

## 2020-01-29 NOTE — PROGRESS NOTES
Cristine Mar is a 34 y.o. male.       Chief Complaint -knee pain    History of Present Illness -       Knee pain-  He complains of bilateral moderate to severe intermittent achy knee pain that is worse with ambulation or weightbearing.  He states that his knee occasionally locks when he is walking and he has to work the knee joint prior to his loosening up.  He reports that pain has been significantly worse over the past 2 weeks with cold weather and wet weather.  He has a pending referral to orthopedist but the appointment is not for 2 months.    Obesity-  He complains of continued obesity.  He states that his knees hurt worse when he has more weight carry.  He does not currently have a regular exercise regimen since he is limited due to pain in his knees.  He is not currently following a regular dietary regimen.  Not at goal    The following portions of the patient's history were reviewed and updated as appropriate: allergies, current medications, past family history, past medical history, past social history, past surgical history and problem list.    Review of Systems   Constitutional: Positive for activity change. Negative for appetite change, fatigue and fever.        Obesity   HENT: Negative for ear pain, sinus pressure and sore throat.    Eyes: Negative for pain and visual disturbance.   Respiratory: Negative for cough and chest tightness.    Cardiovascular: Negative for chest pain and palpitations.   Gastrointestinal: Negative for abdominal pain, constipation, diarrhea, nausea and vomiting.   Endocrine: Negative for polydipsia and polyuria.   Genitourinary: Negative for dysuria and frequency.   Musculoskeletal: Positive for arthralgias (knee pain). Negative for back pain and myalgias.   Skin: Negative for color change and rash.   Allergic/Immunologic: Negative for food allergies and immunocompromised state.   Neurological: Negative for dizziness, syncope and headaches.   Hematological: Negative  "for adenopathy. Does not bruise/bleed easily.   Psychiatric/Behavioral: Negative for hallucinations and suicidal ideas. The patient is not nervous/anxious.        /90 (BP Location: Right arm, Patient Position: Sitting, Cuff Size: Adult)   Pulse 113   Temp 98.9 °F (37.2 °C) (Oral)   Resp 18   Ht 170.2 cm (67.01\")   Wt 110 kg (243 lb)   SpO2 98%   BMI 38.05 kg/m²     Physical Exam   Constitutional: He is oriented to person, place, and time. No distress.   Obese pleasant gentleman   Cardiovascular: Normal rate, regular rhythm and normal heart sounds.   No murmur heard.  Pulmonary/Chest: Effort normal and breath sounds normal. No respiratory distress. He has no wheezes.   Musculoskeletal: Normal range of motion. He exhibits tenderness.   Bilateral knee tenderness noted to viral-medial palpation.  Minimal swelling noted today.  Negative anterior posterior drawer sign.  Negative to valgus and varus stress.   Neurological: He is alert and oriented to person, place, and time.   Skin: Skin is warm and dry. No rash noted. He is not diaphoretic.   Psychiatric: He has a normal mood and affect. His behavior is normal.   Nursing note and vitals reviewed.      Assessment/Plan     Diagnoses and all orders for this visit:    Chronic pain of both knees  Comments:  Left and right intra-articular knee joint injections given today  Orders:  -     dexamethasone (DECADRON) injection 4 mg  -     triamcinolone acetonide (KENALOG-40) injection 40 mg    Class 2 obesity due to excess calories without serious comorbidity with body mass index (BMI) of 38.0 to 38.9 in adult  Comments:  Advised calorie counting less than 1800 nidia/day diet to encourage weight loss which should also help with his joint pain    Other orders  -     Flucelvax Quad=>4Years (PFS)      Procedure: Left and right intra-articular knee joint injections  Provider: Nafisa No PA-C  Indication: Bilateral knee pain  Description: The left and right knee joints were " prepped and draped in sterile fashion.  An intra-articular injection was given into each knee joint using 3 cc 1% lidocaine, 1 cc Kenalog and 1 cc triamcinolone.  Pressure was held and sterile dressings were placed.  No complications  Estimated blood loss: Minimal  Patient tolerated procedure well.    Lidocaine 1% 500 mg per 50 mL NDC number 3357-1355-99  Lot #[id number]  Expiration 5/2020    Triamcinolone 40 mg per 1 mL NDC number [address]-6  Lot number [id number]  Expiration 9/2021    Dexamethasone 4 mg/mL NDC number 10880-776-56  Lot #[id number]  Expiration 12/20      This document has been electronically signed by:  Nafisa No PA-C

## 2020-01-29 NOTE — PATIENT INSTRUCTIONS
Joint Steroid Injection  A joint steroid injection is a procedure to relieve swelling and pain in a joint. Steroids are medicines that reduce inflammation. In this procedure, your health care provider uses a syringe and a needle to inject a steroid medicine into a painful and inflamed joint. A pain-relieving medicine (anesthetic) may be injected along with the steroid. In some cases, your health care provider may use an imaging technique such as ultrasound or fluoroscopy to guide the injection.  Joints that are often treated with steroid injections include the knee, shoulder, hip, and spine. These injections may also be used in the elbow, ankle, and joints of the hands or feet. You may have joint steroid injections as part of your treatment for inflammation caused by:  · Gout.  · Rheumatoid arthritis.  · Advanced wear-and-tear arthritis (osteoarthritis).  · Tendinitis.  · Bursitis.  Joint steroid injections may be repeated, but having them too often can damage a joint or the skin over the joint. You should not have joint steroid injections less than 6 weeks apart or more than four times a year.  Tell a health care provider about:  · Any allergies you have.  · All medicines you are taking, including vitamins, herbs, eye drops, creams, and over-the-counter medicines.  · Any problems you or family members have had with anesthetic medicines.  · Any blood disorders you have.  · Any surgeries you have had.  · Any medical conditions you have.  · Whether you are pregnant or may be pregnant.  What are the risks?  Generally, this is a safe treatment. However, problems may occur, including:  · Infection.  · Bleeding.  · Allergic reactions to medicines.  · Damage to the joint or tissues around the joint.  · Thinning of skin or loss of skin color over the joint.  · Temporary flushing of the face or chest.  · Temporary increase in pain.  · Temporary increase in blood sugar.  · Failure to relieve inflammation or pain.  What  happens before the treatment?  · You may have imaging tests of your joint.  · Ask your health care provider about:  ? Changing or stopping your regular medicines. This is especially important if you are taking diabetes medicines or blood thinners.  ? Taking medicines such as aspirin and ibuprofen. These medicines can thin your blood. Do not take these medicines unless your health care provider tells you to take them.  ? Taking over-the-counter medicines, vitamins, herbs, and supplements.  · Ask your health care provider if you can drive yourself home after the procedure.  What happens during the treatment?    · Your health care provider will position you for the injection and locate the injection site over your joint.  · The skin over the joint will be cleaned with a germ-killing soap.  · Your health care provider may:  ? Spray a numbing solution (topical anesthetic) over the injection site.  ? Inject a local anesthetic under the skin above your joint.  · The needle will be placed through your skin into your joint. Your health care provider may use imaging to guide the needle to the right spot for the injection. If imaging is used, a special contrast dye may be injected to confirm that the needle is in the correct location.  · The steroid medicine will be injected into your joint.  · Anesthetic may be injected along with the steroid. This may be a medicine that relieves pain for a short time (short-acting anesthetic) or for a longer time (long-acting anesthetic).  · The needle will be removed, and an adhesive bandage (dressing) will be placed over the injection site.  The procedure may vary among health care providers and hospitals.  What can I expect after the treatment?  · You will be able to go home after the treatment.  · It is normal to feel slight flushing for a few days after the injection.  · After the treatment, it is common to have an increase in joint pain after the anesthetic has worn off. This may  happen about an hour after a short-acting anesthetic or about 8 hours after a longer-acting anesthetic.  · You should begin to feel relief from joint pain and swelling after 24 to 48 hours.  Follow these instructions at home:  Injection site care  · Leave the adhesive dressing over your injection site in place until your health care provider says you can remove it.  · Check your injection site every day for signs of infection. Check for:  ? Redness, swelling, or pain.  ? Fluid or blood.  ? Warmth.  ? Pus or a bad smell.  Activity  · Return to your normal activities as told by your health care provider. Ask your health care provider what activities are safe for you. You may be asked to limit activities that put stress on the joint for a few days.  · Do joint exercises as told by your health care provider.  · Do not take baths, swim, or use a hot tub until your health care provider approves.  Managing pain, stiffness, and swelling    · If directed, put ice on the joint.  ? Put ice in a plastic bag.  ? Place a towel between your skin and the bag.  ? Leave the ice on for 20 minutes, 2-3 times a day.  · Raise (elevate) your joint above the level of your heart when you are sitting or lying down.  General instructions  · Take over-the-counter and prescription medicines only as told by your health care provider.  · Do not use any products that contain nicotine or tobacco, such as cigarettes, e-cigarettes, and chewing tobacco. These can delay joint healing. If you need help quitting, ask your health care provider.  · If you have diabetes, be aware that your blood sugar may be slightly elevated for several days after the injection.  · Keep all follow-up visits as told by your health care provider. This is important.  Contact a health care provider if you have:  · Chills or a fever.  · Any signs of infection at your injection site.  · Increased pain or swelling or no relief after 2 days.  Summary  · A joint steroid injection  is a treatment to relieve pain and swelling in a joint.  · Steroids are medicines that reduce inflammation. Your health care provider may add an anesthetic along with the steroid.  · You may have joint steroid injections as part of your arthritis treatment.  · Joint steroid injections may be repeated, but having them too often can damage a joint or the skin over the joint.  · Contact your health care provider if you have a fever, chills, or signs of infection or if you get no relief from joint pain or swelling.  This information is not intended to replace advice given to you by your health care provider. Make sure you discuss any questions you have with your health care provider.  Document Released: 08/20/2019 Document Revised: 08/20/2019 Document Reviewed: 08/20/2019  [employer] Interactive Patient Education © 2019 [employer]

## 2020-02-21 DIAGNOSIS — G89.29 CHRONIC PAIN OF BOTH KNEES: Primary | ICD-10-CM

## 2020-02-21 DIAGNOSIS — M25.562 CHRONIC PAIN OF BOTH KNEES: Primary | ICD-10-CM

## 2020-02-21 DIAGNOSIS — M25.561 CHRONIC PAIN OF BOTH KNEES: Primary | ICD-10-CM

## 2020-02-25 ENCOUNTER — OFFICE VISIT (OUTPATIENT)
Dept: FAMILY MEDICINE CLINIC | Facility: CLINIC | Age: 35
End: 2020-02-25

## 2020-02-25 VITALS
HEART RATE: 96 BPM | TEMPERATURE: 98.7 F | WEIGHT: 243 LBS | SYSTOLIC BLOOD PRESSURE: 120 MMHG | OXYGEN SATURATION: 98 % | HEIGHT: 67 IN | DIASTOLIC BLOOD PRESSURE: 80 MMHG | BODY MASS INDEX: 38.14 KG/M2

## 2020-02-25 DIAGNOSIS — G89.29 CHRONIC PAIN OF BOTH KNEES: Primary | ICD-10-CM

## 2020-02-25 DIAGNOSIS — M25.562 CHRONIC PAIN OF BOTH KNEES: Primary | ICD-10-CM

## 2020-02-25 DIAGNOSIS — E66.09 CLASS 2 OBESITY DUE TO EXCESS CALORIES WITHOUT SERIOUS COMORBIDITY WITH BODY MASS INDEX (BMI) OF 38.0 TO 38.9 IN ADULT: ICD-10-CM

## 2020-02-25 DIAGNOSIS — R10.84 GENERALIZED ABDOMINAL PAIN: ICD-10-CM

## 2020-02-25 DIAGNOSIS — M25.561 CHRONIC PAIN OF BOTH KNEES: Primary | ICD-10-CM

## 2020-02-25 PROCEDURE — 99214 OFFICE O/P EST MOD 30 MIN: CPT | Performed by: NURSE PRACTITIONER

## 2020-02-25 RX ORDER — HYDROCODONE BITARTRATE AND ACETAMINOPHEN 5; 325 MG/1; MG/1
1 TABLET ORAL EVERY 4 HOURS PRN
Qty: 20 TABLET | Refills: 0 | Status: SHIPPED | OUTPATIENT
Start: 2020-02-25 | End: 2020-03-25

## 2020-02-25 NOTE — ASSESSMENT & PLAN NOTE
Continue under the care of GI.  Avoid NSAIDs due to gastric irritation.  Avoid fried and spicy foods to eliminate further irritation.

## 2020-02-25 NOTE — PROGRESS NOTES
"Cristine Mar is a 34 y.o. male.     Chief Complaint   Patient presents with   • Knee Pain       History of Present Illness     Knee pain-chronic and ongoing.  Has been to see Dr Dubin.  Right is not as bad as left he reports.  He reports he had xrays at Dr Dubin's office.  He has an appt in approx 3 weeks.  He has been to PT ([facility]) but reports he does not feel it is helping.   He has had a cortisone injection that he did not tolerate meds well. He is wearing bilateral knee braces \"24/7\".  He is not allowed to lift.  He reports he has some cartilage loss.  He has been been referred to pain management due to his discomfort.  He reports he is becoming more irritable due to pain.    Abd Pain-reports he has been taking excessive amounts of Ibuprofen and now has stomach irritation.  He reports he has been trying to hold the meds.  He reports \"they are not helping much anyway\".  He has been advised per GI that he also has fatty liver and he needs to be working on weight management and reduction.  He has been working on his diet and has been eliminating fried fatty foods.  Ongoing    The following portions of the patient's history were reviewed and updated as appropriate: CC, ROS, allergies, current medications, past family history, past medical history, past social history, past surgical history and problem list.      Review of Systems   Constitutional: Negative for appetite change, fatigue and unexpected weight change.   HENT: Negative for congestion, ear pain, nosebleeds, postnasal drip, rhinorrhea, sore throat, trouble swallowing and voice change.    Eyes: Negative for pain and visual disturbance.   Respiratory: Negative for cough, shortness of breath and wheezing.    Cardiovascular: Negative for chest pain and palpitations.   Gastrointestinal: Positive for abdominal pain. Negative for blood in stool, constipation and diarrhea.   Endocrine: Negative for cold intolerance and polydipsia.   Genitourinary: " "Negative for difficulty urinating, flank pain and hematuria.   Musculoskeletal: Positive for arthralgias. Negative for back pain, gait problem, joint swelling and myalgias.   Skin: Negative for color change and rash.   Allergic/Immunologic: Negative.    Neurological: Negative for syncope, numbness and headaches.   Hematological: Negative.    Psychiatric/Behavioral: Negative for sleep disturbance and suicidal ideas.   All other systems reviewed and are negative.      Objective     /80   Pulse 96   Temp 98.7 °F (37.1 °C) (Temporal)   Ht 170.2 cm (67\")   Wt 110 kg (243 lb)   SpO2 98%   BMI 38.06 kg/m²     Physical Exam   Constitutional: He is oriented to person, place, and time. He appears well-developed and well-nourished. No distress.   HENT:   Head: Normocephalic and atraumatic.   Right Ear: Hearing, tympanic membrane, external ear and ear canal normal.   Left Ear: Hearing, tympanic membrane, external ear and ear canal normal.   Nose: No mucosal edema or rhinorrhea.   Mouth/Throat: Oropharynx is clear and moist and mucous membranes are normal. Dental caries present.   Eyes: Pupils are equal, round, and reactive to light. Conjunctivae, EOM and lids are normal. No scleral icterus. Right eye exhibits normal extraocular motion and no nystagmus. Left eye exhibits normal extraocular motion and no nystagmus.   Neck: Normal range of motion. Neck supple. No JVD present. No tracheal tenderness present. Carotid bruit is not present. No thyromegaly present.   Cardiovascular: Normal rate, regular rhythm, S1 normal, S2 normal, normal heart sounds and intact distal pulses.   No murmur heard.  Pulmonary/Chest: Effort normal and breath sounds normal. He exhibits no tenderness.   Abdominal: Soft. Bowel sounds are normal. He exhibits no mass. There is no hepatosplenomegaly. There is generalized tenderness and tenderness in the right upper quadrant.   Musculoskeletal: He exhibits no edema.        Right knee: Tenderness " found. Medial joint line, lateral joint line and patellar tendon tenderness noted.        Left knee: Tenderness found. Medial joint line and lateral joint line tenderness noted.        Lumbar back: He exhibits decreased range of motion and tenderness.   Gait normal.   equal bilaterally. No muscular atrophy or flaccidity.   Lymphadenopathy:     He has no cervical adenopathy.        Right cervical: No superficial cervical adenopathy present.       Left cervical: No superficial cervical adenopathy present.   Neurological: He is alert and oriented to person, place, and time. He has normal strength and normal reflexes. He displays no tremor. No cranial nerve deficit or sensory deficit. He exhibits normal muscle tone. Coordination and gait normal.   Skin: Skin is warm and dry. Capillary refill takes less than 2 seconds. He is not diaphoretic. No cyanosis. No pallor. Nails show no clubbing.   Psychiatric: He has a normal mood and affect. His speech is normal and behavior is normal. Judgment and thought content normal. He is not actively hallucinating. Cognition and memory are normal. He is attentive.   Vitals reviewed.      Assessment/Plan     Problem List Items Addressed This Visit        Digestive    Class 2 obesity due to excess calories without serious comorbidity with body mass index (BMI) of 38.0 to 38.9 in adult    Current Assessment & Plan     Dietary counseling provided:  Healthy food choices including fruits and vegetables, limit soda and junk foods, adequate water intake.  Increase in physical activity advised at least 30 minutes per day 3-5 days per week.              Nervous and Auditory    Generalized abdominal pain    Current Assessment & Plan     Continue under the care of GI.  Avoid NSAIDs due to gastric irritation.  Avoid fried and spicy foods to eliminate further irritation.            Musculoskeletal and Integument    Chronic pain of both knees - Primary    Relevant Medications     HYDROcodone-acetaminophen (NORCO) 5-325 MG per tablet        Patient's Body mass index is 38.06 kg/m². BMI is above normal parameters. Recommendations include: nutrition counseling.  Understands disease processes and need for medications.  Understands reasons for urgent and emergent care.  Patient (& family) verbalized agreement for treatment plan.   Emotional support and active listening provided.  Patient provided time to verbalize feelings.    Will request Dr Dubin records.    LA[facility] reviewed today and consistent.  Will refill prescribed controlled medication today.  Patient is aware they cannot receive narcotics from any other provider except if under care of pain management or speciality clinic.  Risk and benefits of medication use has been reviewed.  History and physical exam exhibit continued safe and appropriate use of controlled substances.  The patient is aware of the potential for addiction and dependence.  This patient has been made aware of the appropriate use of such medications, including potential risk of somnolence, limited ability to drive and / or work safely, and potential for overdose.    It has also been made clear that these medications are for use by this patient only, without concomitant use of alcohol or other substances unless prescribed/advised by medical provider.  Patient understands they may be subject to UDS and pill counts at random.      Patient considered to be low risk for addiction due to use of single controlled medications.  Patient understands and accepts these risks.  Patient need for medication will be reassessed at each visit.  Doses will be adjusted according to patient need and findings.    Goal of TX: Patient will not have any adverse reactions of medication.  Patient have reduction in pain symptoms with use of PRN Norco as directed.  Patient will not have any adverse side effects from medication.  Patient will be able to remain active in an outside of home without  interference from pain symptoms.    RTC 1 month, sooner if needed.             This document has been electronically signed by:  ARTUR Brothers FNP-C Dragon disclaimer:  Much of this encounter note is an electronic transcription/translation of spoken language to printed text. The electronic translation of spoken language may permit erroneous, or at times, nonsensical words or phrases to be inadvertently transcribed; Although I have reviewed the note for such errors, some may still exist.

## 2020-02-26 ENCOUNTER — DOCUMENTATION (OUTPATIENT)
Dept: FAMILY MEDICINE CLINIC | Facility: CLINIC | Age: 35
End: 2020-02-26

## 2020-03-25 ENCOUNTER — OFFICE VISIT (OUTPATIENT)
Dept: FAMILY MEDICINE CLINIC | Facility: CLINIC | Age: 35
End: 2020-03-25

## 2020-03-25 VITALS
BODY MASS INDEX: 38.61 KG/M2 | TEMPERATURE: 97.7 F | WEIGHT: 246 LBS | HEART RATE: 114 BPM | HEIGHT: 67 IN | DIASTOLIC BLOOD PRESSURE: 82 MMHG | SYSTOLIC BLOOD PRESSURE: 120 MMHG | OXYGEN SATURATION: 98 %

## 2020-03-25 DIAGNOSIS — G89.29 CHRONIC PAIN OF BOTH KNEES: Primary | ICD-10-CM

## 2020-03-25 DIAGNOSIS — J06.9 ACUTE URI: ICD-10-CM

## 2020-03-25 DIAGNOSIS — M25.562 CHRONIC PAIN OF BOTH KNEES: Primary | ICD-10-CM

## 2020-03-25 DIAGNOSIS — E66.09 CLASS 2 OBESITY DUE TO EXCESS CALORIES WITHOUT SERIOUS COMORBIDITY WITH BODY MASS INDEX (BMI) OF 38.0 TO 38.9 IN ADULT: ICD-10-CM

## 2020-03-25 DIAGNOSIS — M25.561 CHRONIC PAIN OF BOTH KNEES: Primary | ICD-10-CM

## 2020-03-25 PROBLEM — E66.01 MORBIDLY OBESE (HCC): Status: ACTIVE | Noted: 2020-03-25

## 2020-03-25 PROCEDURE — 99213 OFFICE O/P EST LOW 20 MIN: CPT | Performed by: NURSE PRACTITIONER

## 2020-03-25 RX ORDER — LEVOFLOXACIN 500 MG/1
500 TABLET, FILM COATED ORAL DAILY
Qty: 10 TABLET | Refills: 0 | Status: SHIPPED | OUTPATIENT
Start: 2020-03-25 | End: 2020-04-20

## 2020-03-25 RX ORDER — NABUMETONE 500 MG/1
1 TABLET, FILM COATED ORAL 2 TIMES DAILY
COMMUNITY
Start: 2020-03-11 | End: 2020-06-30 | Stop reason: SDUPTHER

## 2020-03-25 RX ORDER — PROMETHAZINE HYDROCHLORIDE 6.25 MG/5ML
6.25-12.5 SYRUP ORAL EVERY 6 HOURS PRN
Qty: 60 ML | Refills: 0 | Status: SHIPPED | OUTPATIENT
Start: 2020-03-25 | End: 2020-04-20

## 2020-03-25 RX ORDER — GABAPENTIN 100 MG/1
1 CAPSULE ORAL EVERY 8 HOURS SCHEDULED
COMMUNITY
End: 2020-03-25 | Stop reason: SDUPTHER

## 2020-03-25 RX ORDER — GUAIFENESIN AND DEXTROMETHORPHAN HYDROBROMIDE 100; 10 MG/5ML; MG/5ML
5-10 SOLUTION ORAL EVERY 6 HOURS PRN
Qty: 60 ML | Refills: 0 | Status: SHIPPED | OUTPATIENT
Start: 2020-03-25 | End: 2020-04-20

## 2020-03-25 NOTE — PROGRESS NOTES
"Cristine Mar is a 34 y.o. male.     Chief Complaint   Patient presents with   • Knee Pain       History of Present Illness     Knee pain-has been seen at [facility].  Started on Gabapentin 100 mg TID per their orders.  Not recommended for opioid therapy.  Also started on Nabumetone.  He reports his pain management appt did get cancelled.   He reports nabumetone has helped his knee swelling \"a lot\".  \"I like that pill\".    Cough-treated for fever and URI last week.  Cough remains.  Some production.  He reports \"brown secretions\".  Has taken Amoxicillin 875 mg and cough meds.  Is feeling mildly better.   He reports he was advised to follow up with PCP.  Fever at 101.  He has not had in approx 1 week.  Not at goal.    Obesity-ongoing.  Mild continued weight gain. Is not able to be \"too\" active due to his knee pain.      The following portions of the patient's history were reviewed and updated as appropriate: CC, ROS, allergies, current medications, past family history, past medical history, past social history, past surgical history and problem list.      Review of Systems   Constitutional: Negative for appetite change, fatigue and unexpected weight change.   HENT: Positive for congestion, postnasal drip and rhinorrhea. Negative for ear pain, nosebleeds, sore throat, trouble swallowing and voice change.    Eyes: Negative for photophobia, pain and visual disturbance.   Respiratory: Positive for cough. Negative for chest tightness, shortness of breath and wheezing.    Cardiovascular: Negative for chest pain and palpitations.   Gastrointestinal: Negative for abdominal pain, blood in stool, constipation, diarrhea and nausea.   Endocrine: Negative for cold intolerance and polydipsia.   Genitourinary: Negative for difficulty urinating, flank pain, frequency and hematuria.   Musculoskeletal: Positive for arthralgias and back pain. Negative for gait problem, joint swelling and myalgias.   Skin: " "Negative for color change and rash.   Allergic/Immunologic: Negative.    Neurological: Negative for dizziness, syncope, numbness and headaches.   Hematological: Negative.    Psychiatric/Behavioral: Negative for dysphoric mood, sleep disturbance and suicidal ideas. The patient is not nervous/anxious.    All other systems reviewed and are negative.      Objective     /82   Pulse 114   Temp 97.7 °F (36.5 °C) (Temporal)   Ht 170.2 cm (67\")   Wt 112 kg (246 lb)   SpO2 98%   BMI 38.53 kg/m²     Physical Exam   Constitutional: He is oriented to person, place, and time. He appears well-developed and well-nourished. No distress.   HENT:   Head: Normocephalic and atraumatic.   Right Ear: Hearing, external ear and ear canal normal. A middle ear effusion is present.   Left Ear: Hearing, external ear and ear canal normal. A middle ear effusion is present.   Nose: Rhinorrhea present. No mucosal edema.   Mouth/Throat: Dental caries present.   oropharynx not examined due to current COVID pandemic   Eyes: Pupils are equal, round, and reactive to light. Conjunctivae, EOM and lids are normal. No scleral icterus. Right eye exhibits normal extraocular motion and no nystagmus. Left eye exhibits normal extraocular motion and no nystagmus.   Neck: Normal range of motion. Neck supple. No JVD present. No tracheal tenderness present. Carotid bruit is not present. No thyromegaly present.   Cardiovascular: Normal rate, regular rhythm, S1 normal, S2 normal, normal heart sounds and intact distal pulses.   No murmur heard.  Pulmonary/Chest: Effort normal. He has decreased breath sounds in the right upper field. He has wheezes in the right upper field. He exhibits no tenderness.   Abdominal: Soft. Bowel sounds are normal. He exhibits no mass. There is no hepatosplenomegaly. There is generalized tenderness and tenderness in the right upper quadrant.   Musculoskeletal: He exhibits no edema.        Right knee: Tenderness found. Medial " joint line, lateral joint line and patellar tendon tenderness noted.        Left knee: Tenderness found. Medial joint line and lateral joint line tenderness noted.        Lumbar back: He exhibits decreased range of motion and tenderness.   Gait normal.   equal bilaterally. No muscular atrophy or flaccidity.   Lymphadenopathy:     He has no cervical adenopathy.        Right cervical: No superficial cervical adenopathy present.       Left cervical: No superficial cervical adenopathy present.   Neurological: He is alert and oriented to person, place, and time. He has normal strength and normal reflexes. He displays no tremor. No cranial nerve deficit or sensory deficit. He exhibits normal muscle tone. Coordination and gait normal.   Skin: Skin is warm and dry. Capillary refill takes less than 2 seconds. He is not diaphoretic. No cyanosis. No pallor. Nails show no clubbing.   Psychiatric: He has a normal mood and affect. His speech is normal and behavior is normal. Judgment and thought content normal. He is not actively hallucinating. Cognition and memory are normal. He is attentive.   Vitals reviewed.      Assessment/Plan     Problem List Items Addressed This Visit        Digestive    Class 2 obesity due to excess calories without serious comorbidity with body mass index (BMI) of 38.0 to 38.9 in adult    Current Assessment & Plan     Dietary counseling provided:  Healthy food choices including fruits and vegetables, limit soda and junk foods, adequate water intake.  Increase in physical activity advised at least 30 minutes per day 3-5 days per week.            Musculoskeletal and Integument    Chronic pain of both knees - Primary    Relevant Medications    nabumetone (RELAFEN) 500 MG tablet    gabapentin (NEURONTIN) 100 MG capsule    HYDROcodone-acetaminophen (Norco) 5-325 MG per tablet      Other Visit Diagnoses     Acute URI        Relevant Medications    albuterol (PROVENTIL,VENTOLIN) 2 MG/5ML syrup     promethazine (PHENERGAN) 6.25 MG/5ML syrup    dextromethorphan-guaifenesin (Tussin DM)  MG/5ML syrup    levoFLOXacin (Levaquin) 500 MG tablet          Patient's Body mass index is 38.53 kg/m². BMI is above normal parameters. Recommendations include: nutrition counseling.     Understands disease processes and need for medications.  Understands reasons for urgent and emergent care.  Patient (& family) verbalized agreement for treatment plan.   Emotional support and active listening provided.  Patient provided time to verbalize feelings.    Instructed to complete all of antibiotics for acute illness.  Increase PO fluids, avoid/limit caffeine.  Do not save any of the meds for later use.  Rest PRN  Continue meds    LA[facility] requested.     Resume care under pain provider when they are accepting patients again.    RTC 2 months, sooner if needed.           This document has been electronically signed by:  ARTUR Brothers, FNP-C    Dragon disclaimer:  Much of this encounter note is an electronic transcription/translation of spoken language to printed text. The electronic translation of spoken language may permit erroneous, or at times, nonsensical words or phrases to be inadvertently transcribed; Although I have reviewed the note for such errors, some may still exist.

## 2020-03-26 RX ORDER — HYDROCODONE BITARTRATE AND ACETAMINOPHEN 5; 325 MG/1; MG/1
1 TABLET ORAL EVERY 4 HOURS PRN
Qty: 12 TABLET | Refills: 0 | Status: SHIPPED | OUTPATIENT
Start: 2020-03-26 | End: 2020-11-02

## 2020-03-26 RX ORDER — GABAPENTIN 100 MG/1
100 CAPSULE ORAL EVERY 8 HOURS SCHEDULED
Qty: 90 CAPSULE | Refills: 0 | Status: SHIPPED | OUTPATIENT
Start: 2020-03-26 | End: 2020-11-02 | Stop reason: SDUPTHER

## 2020-03-26 NOTE — ASSESSMENT & PLAN NOTE
Dietary counseling provided:  Healthy food choices including fruits and vegetables, limit soda and junk foods, adequate water intake.  Increase in physical activity advised at least 30 minutes per day 3-5 days per week.

## 2020-04-20 ENCOUNTER — OFFICE VISIT (OUTPATIENT)
Dept: FAMILY MEDICINE CLINIC | Facility: CLINIC | Age: 35
End: 2020-04-20

## 2020-04-20 DIAGNOSIS — F51.05 INSOMNIA SECONDARY TO DEPRESSION WITH ANXIETY: Primary | ICD-10-CM

## 2020-04-20 DIAGNOSIS — M25.561 CHRONIC PAIN OF BOTH KNEES: ICD-10-CM

## 2020-04-20 DIAGNOSIS — M25.562 CHRONIC PAIN OF BOTH KNEES: ICD-10-CM

## 2020-04-20 DIAGNOSIS — G89.29 CHRONIC PAIN OF BOTH KNEES: ICD-10-CM

## 2020-04-20 DIAGNOSIS — F41.8 INSOMNIA SECONDARY TO DEPRESSION WITH ANXIETY: Primary | ICD-10-CM

## 2020-04-20 PROCEDURE — 99214 OFFICE O/P EST MOD 30 MIN: CPT | Performed by: NURSE PRACTITIONER

## 2020-04-20 RX ORDER — NORTRIPTYLINE HYDROCHLORIDE 10 MG/1
10-20 CAPSULE ORAL NIGHTLY
Qty: 60 CAPSULE | Refills: 0 | Status: SHIPPED | OUTPATIENT
Start: 2020-04-20 | End: 2020-05-05 | Stop reason: DRUGHIGH

## 2020-04-20 NOTE — PROGRESS NOTES
"You have chosen to receive care through a telephone visit today. Do you consent to use a telephone visit for your medical care today? Yes    Establish patient called office of APRN to discuss health conditions.  Patient was due for routine checkup but due to current pandemic is not able to come to the office.    Brief HPI/ROS obtained as follows:    Knee pain-chronic and ongoing.   He is under care of pain management.  He will be doing a follow up video call \"next month\".  He has been added Nabumetone.  He has been added zanaflex.  He will be continued on Gabapentin.    Depression-reports not sleeping well.  More depression.  He reports he feels more irritable.  He reports some past \"things in my childhood\".  He has never been treated for depression in the past.  Today he reports \"I feel like I need an antidepressant\".  He reports that he has gotten a new dog that has helped him with some of the anxiety headed been experiencing for the last 2 weeks.  Depressive symptoms are unchanged however.    Review of Systems   Constitutional: Positive for fatigue. Negative for appetite change and unexpected weight change.   HENT: Negative for congestion, ear pain, nosebleeds, postnasal drip, rhinorrhea, sore throat, trouble swallowing and voice change.    Eyes: Negative for photophobia, pain and visual disturbance.   Respiratory: Negative for cough, chest tightness, shortness of breath and wheezing.    Cardiovascular: Negative for chest pain and palpitations.   Gastrointestinal: Negative for abdominal pain, blood in stool, constipation, diarrhea and nausea.   Endocrine: Negative for cold intolerance and polydipsia.   Genitourinary: Negative for difficulty urinating, flank pain, frequency and hematuria.   Musculoskeletal: Positive for arthralgias. Negative for back pain, gait problem, joint swelling and myalgias.   Skin: Negative for color change and rash.   Allergic/Immunologic: Negative.    Neurological: Negative for " dizziness, syncope, numbness and headaches.   Hematological: Negative.    Psychiatric/Behavioral: Positive for dysphoric mood and sleep disturbance. Negative for suicidal ideas. The patient is nervous/anxious.    All other systems reviewed and are negative.      The following portions of the patient's history were reviewed and updated as appropriate: CC, ROS, allergies, current medications, past family history, past medical history, past social history, past surgical history and problem list.      Assessment     Objective     Physical Exam     Patient alert and oriented.  Able to follow conversation without sounding breathless.  No obvious distress.  Thought processes congruent with conversation.  Answers and ask questions without difficulty.    Problem List Items Addressed This Visit        Musculoskeletal and Integument    Chronic pain of both knees    Current Assessment & Plan     Reviewed Chi.  Patient encouraged to continue under the care of pain management provider.  Report any new concerns or worsening symptoms.            Other    Insomnia secondary to depression with anxiety - Primary    Relevant Medications    nortriptyline (PAMELOR) 10 MG capsule            Patient instructed and advised to call if symptoms are increasing or new symptoms occur.    Understands reasons for urgent and emergent care.  Patient (& family) verbalized agreement for treatment plan.     Generalized precautions advised including social distancing, hand washing, cough/sneeze hygiene.     Continue under care of pain management.      Trial of Pamelor.  Lengthy discussion with patient regarding depression and anxiety.  Discussed with patient that due to his chronic pain will try tricyclic antidepressant to aid with sleep as well as to see if his chronic pain is reduced.  Discussed with patient reasons to stop medications such as increased irritability, SI, or worsening depressive thoughts.    RTC 2-3 weeks for re-eval, sooner for  worsening of symptoms.  Telephone visit will be scheduled today due to current pandemic.      This visit has been rescheduled as a phone visit to comply with patient safety concerns in accordance with CDC recommendations. Total time of discussion was 21 minutes.          This document has been electronically signed by:  ARTUR Brothers, LILIANA

## 2020-04-20 NOTE — ASSESSMENT & PLAN NOTE
Fatou Mireles.  Patient encouraged to continue under the care of pain management provider.  Report any new concerns or worsening symptoms.

## 2020-05-05 ENCOUNTER — OFFICE VISIT (OUTPATIENT)
Dept: FAMILY MEDICINE CLINIC | Facility: CLINIC | Age: 35
End: 2020-05-05

## 2020-05-05 DIAGNOSIS — F41.8 INSOMNIA SECONDARY TO DEPRESSION WITH ANXIETY: Primary | ICD-10-CM

## 2020-05-05 DIAGNOSIS — F51.05 INSOMNIA SECONDARY TO DEPRESSION WITH ANXIETY: Primary | ICD-10-CM

## 2020-05-05 PROCEDURE — 99441 PR PHYS/QHP TELEPHONE EVALUATION 5-10 MIN: CPT | Performed by: NURSE PRACTITIONER

## 2020-05-05 RX ORDER — TIZANIDINE 4 MG/1
4 TABLET ORAL NIGHTLY PRN
COMMUNITY
End: 2020-05-19 | Stop reason: SDUPTHER

## 2020-05-05 RX ORDER — NORTRIPTYLINE HYDROCHLORIDE 25 MG/1
25-50 CAPSULE ORAL NIGHTLY
Qty: 60 CAPSULE | Refills: 2 | Status: SHIPPED | OUTPATIENT
Start: 2020-05-05 | End: 2020-06-30 | Stop reason: SDUPTHER

## 2020-05-05 NOTE — PROGRESS NOTES
"You have chosen to receive care through a telephone visit today. Do you consent to use a telephone visit for your medical care today? Yes    Establish patient makes contact with office of APRN to discuss health conditions.  Patient is due for routine checkup but due to current pandemic is not recommended to present to the office for face-to-face evaluation.      Chief Complaint   Patient presents with   • Depression   • Insomnia       Brief HPI/ROS obtained as follows:    Depression-doing better with nortriptyline.  He reports \"feeling happier\".  He has increased to 2 capsules.  He reports that he is tolerated the medication well is not having any negative side effects.  Insomnia-Continues to not be able to sleep.  He reports pattern has been off for the last 4 months since the birth of his child.  He reports he feels he dozes and then wakes frequently and is not able to fall back asleep.  He continues to have difficulty falling asleep.   He is trying to watch caffeine intake.  He is also tried to be more active physically as he felt this might make him tired and respirator but is not been helpful.  Ongoing    Review of Systems   Constitutional: Positive for fatigue. Negative for appetite change and unexpected weight change.   HENT: Negative for congestion, ear pain, nosebleeds, postnasal drip, rhinorrhea, sore throat, trouble swallowing and voice change.    Eyes: Negative for photophobia, pain and visual disturbance.   Respiratory: Negative for cough, chest tightness, shortness of breath and wheezing.    Cardiovascular: Negative for chest pain and palpitations.   Gastrointestinal: Negative for abdominal pain, blood in stool, constipation, diarrhea and nausea.   Endocrine: Negative for cold intolerance and polydipsia.   Genitourinary: Negative for difficulty urinating, flank pain, frequency and hematuria.   Musculoskeletal: Positive for arthralgias. Negative for back pain, gait problem, joint swelling and myalgias.  "   Skin: Negative for color change and rash.   Allergic/Immunologic: Negative.    Neurological: Negative for dizziness, syncope, numbness and headaches.   Hematological: Negative.    Psychiatric/Behavioral: Positive for dysphoric mood and sleep disturbance. Negative for suicidal ideas. The patient is nervous/anxious.    All other systems reviewed and are negative.      The following portions of the patient's history were reviewed and updated as appropriate: CC, ROS, allergies, current medications, past family history, past medical history, past social history, past surgical history and problem list.    Assessment     Physical Exam     Patient alert and oriented.  Able to follow conversation without sounding breathless.  No obvious distress.  Thought processes congruent with conversation.  Answers and ask questions without difficulty.  Problem List Items Addressed This Visit        Other    Insomnia secondary to depression with anxiety - Primary    Relevant Medications    nortriptyline (PAMELOR) 25 MG capsule            Patient instructed and advised to call if symptoms are increasing or new symptoms occur.    Understands reasons for urgent and emergent care.  Patient (& family) verbalized agreement for treatment plan.     Generalized precautions advised including social distancing, hand washing, cough/sneeze hygiene.     Will increase nortriptyline to 25 mg.  Patient advised to take 1 capsule for the next several nights and increase to 2 capsules per night if continues to be unable to sleep well    RTC in 2 weeks, sooner if needed for problems or concerns      This visit has been rescheduled as a phone visit to comply with patient safety concerns in accordance with CDC recommendations. Total time of discussion was 10 minutes.        This document has been electronically signed by:  ARTUR Brothers, LILIANA

## 2020-05-19 ENCOUNTER — OFFICE VISIT (OUTPATIENT)
Dept: FAMILY MEDICINE CLINIC | Facility: CLINIC | Age: 35
End: 2020-05-19

## 2020-05-19 DIAGNOSIS — M25.562 CHRONIC PAIN OF BOTH KNEES: Primary | ICD-10-CM

## 2020-05-19 DIAGNOSIS — F51.05 INSOMNIA SECONDARY TO DEPRESSION WITH ANXIETY: ICD-10-CM

## 2020-05-19 DIAGNOSIS — F41.8 INSOMNIA SECONDARY TO DEPRESSION WITH ANXIETY: ICD-10-CM

## 2020-05-19 DIAGNOSIS — M25.561 CHRONIC PAIN OF BOTH KNEES: Primary | ICD-10-CM

## 2020-05-19 DIAGNOSIS — G89.29 CHRONIC PAIN OF BOTH KNEES: Primary | ICD-10-CM

## 2020-05-19 PROCEDURE — 99213 OFFICE O/P EST LOW 20 MIN: CPT | Performed by: NURSE PRACTITIONER

## 2020-05-19 RX ORDER — TIZANIDINE 4 MG/1
4 TABLET ORAL 2 TIMES DAILY PRN
Qty: 60 TABLET | Refills: 5 | Status: SHIPPED | OUTPATIENT
Start: 2020-05-19 | End: 2020-06-30 | Stop reason: SDUPTHER

## 2020-05-19 NOTE — PROGRESS NOTES
"You have chosen to receive care through a telephone visit today. Do you consent to use a telephone visit for your medical care today? Yes    Establish patient makes contact with office of APRN to discuss health conditions.  Patient is due for routine checkup but due to current pandemic is not recommended to present to the office for face-to-face evaluation.      No chief complaint on file.      Brief HPI/ROS obtained as follows:    Depression-Reports he is doing better.  He reports \"I feel great\".  He reports he is taking one capsule as two capsules made him \"sleep too much\".  He reports that he is having less symptoms of being down.  He reports that his depression is improved he feels that his sleep pattern has also improved.  He does not have any significant or acute concerns today  Knee pain-increased today.  He reports he feels that is due to the weather.  He continues to take his nabumetone 500 mg as directed.  He is also taking Zanaflex nightly.  He reports he is having some spasm during the day times and questions if he can have the Zanaflex more than daily.  He is also under the direction and care of pain management.  He has an upcoming follow-up appointment with that office as well.    Review of Systems   Constitutional: Negative for appetite change, fatigue and unexpected weight change.   HENT: Negative for congestion, ear pain, nosebleeds, postnasal drip, rhinorrhea, sore throat, trouble swallowing and voice change.    Eyes: Negative for photophobia, pain and visual disturbance.   Respiratory: Negative for cough, chest tightness, shortness of breath and wheezing.    Cardiovascular: Negative for chest pain and palpitations.   Gastrointestinal: Negative for abdominal pain, blood in stool, constipation, diarrhea and nausea.   Endocrine: Negative for cold intolerance and polydipsia.   Genitourinary: Negative for difficulty urinating, flank pain, frequency and hematuria.   Musculoskeletal: Positive for " arthralgias. Negative for back pain, gait problem, joint swelling and myalgias.   Skin: Negative for color change and rash.   Allergic/Immunologic: Negative.    Neurological: Negative for dizziness, syncope, numbness and headaches.   Hematological: Negative.    Psychiatric/Behavioral: Negative for dysphoric mood, sleep disturbance and suicidal ideas. The patient is not nervous/anxious.    All other systems reviewed and are negative.      The following portions of the patient's history were reviewed and updated as appropriate: CC, ROS, allergies, current medications, past family history, past medical history, past social history, past surgical history and problem list.    Assessment     Physical Exam     Patient alert and oriented.  Able to follow conversation without sounding breathless.  No obvious distress.  Thought processes congruent with conversation.  Answers and ask questions without difficulty.    Problem List Items Addressed This Visit        Musculoskeletal and Integument    Chronic pain of both knees - Primary    Relevant Medications    tiZANidine (ZANAFLEX) 4 MG tablet       Other    Insomnia secondary to depression with anxiety    Current Assessment & Plan     Continue nortriptyline 25 mg.  Encourage patient to only take 2 capsules if he is not slept well for greater than 2 to 3 days.  Report any negative side effects.  Stress reduction advised (examples:  make time for self, Reading, Listening to music, Exercise, keeping a journal, venting to a confidant, etc.)                 Patient instructed and advised to call if symptoms are increasing or new symptoms occur.    Understands reasons for urgent and emergent care.  Patient (& family) verbalized agreement for treatment plan.     Generalized precautions advised including social distancing, hand washing, cough/sneeze hygiene.     Discussed upcoming appointment with urology with patient.  Encourage patient to keep urology appointment for planned  vasectomy.  Will increase tizanidine to twice daily as needed today.  Continue under the care of pain management provider.  RTC 2 months, sooner if needed for problems or concerns.      This visit has been rescheduled as a phone visit to comply with patient safety concerns in accordance with CDC recommendations. Total time of discussion was 10 minutes.        This document has been electronically signed by:  ARTUR Brothers, LILIANA

## 2020-05-19 NOTE — ASSESSMENT & PLAN NOTE
Continue nortriptyline 25 mg.  Encourage patient to only take 2 capsules if he is not slept well for greater than 2 to 3 days.  Report any negative side effects.  Stress reduction advised (examples:  make time for self, Reading, Listening to music, Exercise, keeping a journal, venting to a confidant, etc.)

## 2020-06-30 DIAGNOSIS — F51.05 INSOMNIA SECONDARY TO DEPRESSION WITH ANXIETY: ICD-10-CM

## 2020-06-30 DIAGNOSIS — M25.562 CHRONIC PAIN OF BOTH KNEES: ICD-10-CM

## 2020-06-30 DIAGNOSIS — M25.561 CHRONIC PAIN OF BOTH KNEES: ICD-10-CM

## 2020-06-30 DIAGNOSIS — F41.8 INSOMNIA SECONDARY TO DEPRESSION WITH ANXIETY: ICD-10-CM

## 2020-06-30 DIAGNOSIS — G89.29 CHRONIC PAIN OF BOTH KNEES: ICD-10-CM

## 2020-07-01 RX ORDER — GABAPENTIN 100 MG/1
100 CAPSULE ORAL EVERY 8 HOURS SCHEDULED
Qty: 90 CAPSULE | Refills: 0 | OUTPATIENT
Start: 2020-07-01

## 2020-07-01 RX ORDER — NORTRIPTYLINE HYDROCHLORIDE 25 MG/1
25-50 CAPSULE ORAL NIGHTLY
Qty: 60 CAPSULE | Refills: 2 | Status: SHIPPED | OUTPATIENT
Start: 2020-07-01 | End: 2020-11-16

## 2020-07-01 RX ORDER — NABUMETONE 500 MG/1
500 TABLET, FILM COATED ORAL 2 TIMES DAILY
Qty: 60 TABLET | Refills: 1 | Status: SHIPPED | OUTPATIENT
Start: 2020-07-01 | End: 2020-08-20

## 2020-07-01 RX ORDER — TIZANIDINE 4 MG/1
4 TABLET ORAL 2 TIMES DAILY PRN
Qty: 60 TABLET | Refills: 5 | Status: SHIPPED | OUTPATIENT
Start: 2020-07-01 | End: 2020-11-16

## 2020-07-22 DIAGNOSIS — K04.7 DENTAL ABSCESS: Primary | ICD-10-CM

## 2020-07-22 RX ORDER — AMOXICILLIN AND CLAVULANATE POTASSIUM 875; 125 MG/1; MG/1
1 TABLET, FILM COATED ORAL EVERY 12 HOURS SCHEDULED
Qty: 20 TABLET | Refills: 0 | Status: SHIPPED | OUTPATIENT
Start: 2020-07-22 | End: 2020-08-01

## 2020-07-27 DIAGNOSIS — M25.561 CHRONIC PAIN OF BOTH KNEES: ICD-10-CM

## 2020-07-27 DIAGNOSIS — M25.562 CHRONIC PAIN OF BOTH KNEES: ICD-10-CM

## 2020-07-27 DIAGNOSIS — G89.29 CHRONIC PAIN OF BOTH KNEES: ICD-10-CM

## 2020-07-28 RX ORDER — GABAPENTIN 100 MG/1
CAPSULE ORAL
Qty: 90 CAPSULE | Refills: 0 | OUTPATIENT
Start: 2020-07-28

## 2020-08-20 DIAGNOSIS — M25.561 CHRONIC PAIN OF BOTH KNEES: ICD-10-CM

## 2020-08-20 DIAGNOSIS — M25.562 CHRONIC PAIN OF BOTH KNEES: ICD-10-CM

## 2020-08-20 DIAGNOSIS — G89.29 CHRONIC PAIN OF BOTH KNEES: ICD-10-CM

## 2020-08-20 RX ORDER — NABUMETONE 500 MG/1
TABLET, FILM COATED ORAL
Qty: 60 TABLET | Refills: 1 | Status: SHIPPED | OUTPATIENT
Start: 2020-08-20 | End: 2020-12-02 | Stop reason: SDUPTHER

## 2020-10-01 ENCOUNTER — OFFICE VISIT (OUTPATIENT)
Dept: FAMILY MEDICINE CLINIC | Facility: CLINIC | Age: 35
End: 2020-10-01

## 2020-10-01 VITALS
HEART RATE: 108 BPM | TEMPERATURE: 97.5 F | WEIGHT: 243.2 LBS | BODY MASS INDEX: 38.17 KG/M2 | HEIGHT: 67 IN | DIASTOLIC BLOOD PRESSURE: 84 MMHG | SYSTOLIC BLOOD PRESSURE: 130 MMHG | OXYGEN SATURATION: 98 %

## 2020-10-01 DIAGNOSIS — F41.8 INSOMNIA SECONDARY TO DEPRESSION WITH ANXIETY: ICD-10-CM

## 2020-10-01 DIAGNOSIS — Z23 ENCOUNTER FOR IMMUNIZATION: ICD-10-CM

## 2020-10-01 DIAGNOSIS — M25.561 CHRONIC PAIN OF BOTH KNEES: ICD-10-CM

## 2020-10-01 DIAGNOSIS — M25.562 CHRONIC PAIN OF BOTH KNEES: ICD-10-CM

## 2020-10-01 DIAGNOSIS — F51.05 INSOMNIA SECONDARY TO DEPRESSION WITH ANXIETY: ICD-10-CM

## 2020-10-01 DIAGNOSIS — G89.29 CHRONIC PAIN OF BOTH KNEES: ICD-10-CM

## 2020-10-01 DIAGNOSIS — E66.09 CLASS 2 OBESITY DUE TO EXCESS CALORIES WITHOUT SERIOUS COMORBIDITY WITH BODY MASS INDEX (BMI) OF 38.0 TO 38.9 IN ADULT: Primary | ICD-10-CM

## 2020-10-01 PROCEDURE — 99214 OFFICE O/P EST MOD 30 MIN: CPT | Performed by: NURSE PRACTITIONER

## 2020-10-01 PROCEDURE — 90686 IIV4 VACC NO PRSV 0.5 ML IM: CPT | Performed by: NURSE PRACTITIONER

## 2020-10-01 PROCEDURE — 90471 IMMUNIZATION ADMIN: CPT | Performed by: NURSE PRACTITIONER

## 2020-10-01 PROCEDURE — 93000 ELECTROCARDIOGRAM COMPLETE: CPT | Performed by: NURSE PRACTITIONER

## 2020-10-01 RX ORDER — PHENTERMINE HYDROCHLORIDE 37.5 MG/1
37.5 TABLET ORAL
Qty: 30 TABLET | Refills: 0 | Status: SHIPPED | OUTPATIENT
Start: 2020-10-01 | End: 2020-11-02 | Stop reason: SDUPTHER

## 2020-10-01 NOTE — PROGRESS NOTES
"Cristine Mar is a 34 y.o. male.     Chief Complaint   Patient presents with   • Obesity       History of Present Illness     Recent dental surgery-has had all teeth removed due to multiple bad teeth.  Reports gums are still healing.  He reports no pain.    Obesity-reports he has tried Keto.  Had some weight loss then \"stopped\".   He reports he was at approx 260 when he started weight loss at home.   He has history of cardiology problems but had a cardio clearance when he had oral surgery per Dr Ledezma.  He has not had any problems with \"my heart since I was 21\".    Depression-stable.  No concerns today.  On Pamelor 25 mg 2 tabs at [facility].  Reports however he is not sleeping well.  He does have a small baby at home who awakens during the night.    Routine health-patient reports he would like to receive his flu vaccine today.  He is tolerated in the past without any difficulty.    The following portions of the patient's history were reviewed and updated as appropriate: CC, ROS, allergies, current medications, past family history, past medical history, past social history, past surgical history and problem list.      Review of Systems   Constitutional: Positive for unexpected weight gain. Negative for appetite change, fatigue and unexpected weight loss.   HENT: Negative for congestion, ear pain, postnasal drip, rhinorrhea, sore throat, swollen glands, trouble swallowing and voice change.    Eyes: Negative for pain and visual disturbance.   Respiratory: Negative for cough, chest tightness, shortness of breath and wheezing.    Cardiovascular: Negative for chest pain, palpitations and leg swelling.   Gastrointestinal: Negative for abdominal pain, blood in stool, constipation, diarrhea, nausea and indigestion.   Genitourinary: Negative for dysuria, hematuria and urgency.   Musculoskeletal: Negative for arthralgias, back pain, gait problem, joint swelling and myalgias.   Skin: Negative for color change and skin " "lesions.   Allergic/Immunologic: Negative.    Neurological: Negative for dizziness, numbness, headache and confusion.   Hematological: Negative.    Psychiatric/Behavioral: Positive for sleep disturbance (reports pamelor is not helping as much). Negative for dysphoric mood and suicidal ideas. The patient is not nervous/anxious.    All other systems reviewed and are negative.      Objective     /84   Pulse 108   Temp 97.5 °F (36.4 °C) (Temporal)   Ht 170.2 cm (67\")   Wt 110 kg (243 lb 3.2 oz)   SpO2 98%   BMI 38.09 kg/m²     Physical Exam    Assessment/Plan     Problem List Items Addressed This Visit        Digestive    Class 2 obesity due to excess calories without serious comorbidity with body mass index (BMI) of 38.0 to 38.9 in adult - Primary    Relevant Medications    phentermine (Adipex-P) 37.5 MG tablet    Other Relevant Orders    ECG 12 Lead       Musculoskeletal and Integument    Chronic pain of both knees    Current Assessment & Plan     Continue Relafen as directed.  Continue under the care of pain management for gabapentin and hydrocodone use.            Other    Insomnia secondary to depression with anxiety    Current Assessment & Plan     Continue Pamelor 25 May increase to 3 capsules/day and report if effective.  Stress reduction advised (examples:  make time for self, Reading, Listening to music, Exercise, keeping a journal, venting to a confidant, etc.)         Relevant Medications    phentermine (Adipex-P) 37.5 MG tablet      Other Visit Diagnoses     Encounter for immunization        Relevant Orders    FluLaval Quad >6 Months ([id number]) (Completed)          Patient's Body mass index is 38.09 kg/m². BMI is above normal parameters. Recommendations include: nutrition counseling.     Understands disease processes and need for medications.  Understands reasons for urgent and emergent care.  Patient (& family) verbalized agreement for treatment plan.   Emotional support and active listening " provided.  Patient provided time to verbalize feelings.    LAURI reviewed today and consistent.  Will refill prescribed controlled medication today.  Patient is aware they cannot receive narcotics from any other provider except if under care of pain management or speciality clinic.  Risk and benefits of medication use has been reviewed.  History and physical exam exhibit continued safe and appropriate use of controlled substances.  The patient is aware of the potential for addiction and dependence.  This patient has been made aware of the appropriate use of such medications, including potential risk of somnolence, limited ability to drive and / or work safely, and potential for overdose.    It has also been made clear that these medications are for use by this patient only, without concomitant use of alcohol or other substances unless prescribed/advised by medical provider.  Patient understands they may be subject to UDS and pill counts at random.      Patient considered to be moderate risk for addiction due to use of multiple controlled medications.  Patient understands and accepts these risks.  Patient need for medication will be reassessed at each visit.  Doses will be adjusted according to patient need and findings.    Goal of [place]: Patient will not have any adverse reactions of medication.  Patient will have reduction in weight with use of Adipex as directed with simultaneous diet and lifestyle changes.    Continue under care of pain management for Gabapentin and Norco.      Medication Dispense Information    Hydrocodone/Acetaminophen   Dispensed: 8/19/2020 12:00 AM   Written:  8/19/2020   Unit strength: 325MG/5MG   Days supply: 3   Dispense Note: GivenName=RAULITOYSurName=LOOPBirthDate=[id number]=[address] [place], [place], [address]   Quantity: 14 each   Pharmacy: [facility]   Authorizing provider: LUIZA SYED   Received from: [facility] (Fill History)   Brand or Generic:            Medication Dispense Information    Gabapentin   Dispensed: 7/1/2020 12:00 AM   Written:  3/26/2020   Unit strength: 100MG   Days supply: 30   Dispense Note: GivenName=Melissa=LOOPBirthDate=[id number]=[address] [place], [place], [address]   Quantity: 90 each   Pharmacy: [facility]   Authorizing provider: ENID LYLE   Received from: [facility] (Fill History)   Brand or Generic:       Patient to consider if he wishes to have a pneumococcal vaccine.  Flu vaccine administered today.  Understands risk and benefits.  Understands may be 2-3 week before immunity is obtained.  Information sheet provided today on the [id number] vax.    RTC 1 month, sooner if needed.           This document has been electronically signed by:  ARTUR Brothers, FNP-C    Dragon disclaimer:  Much of this encounter note is an electronic transcription/translation of spoken language to printed text. The electronic translation of spoken language may permit erroneous, or at times, nonsensical words or phrases to be inadvertently transcribed; Although I have reviewed the note for such errors, some may still exist.

## 2020-10-04 NOTE — ASSESSMENT & PLAN NOTE
Continue Relafen as directed.  Continue under the care of pain management for gabapentin and hydrocodone use.

## 2020-10-04 NOTE — ASSESSMENT & PLAN NOTE
Continue Pamelor 25 May increase to 3 capsules/day and report if effective.  Stress reduction advised (examples:  make time for self, Reading, Listening to music, Exercise, keeping a journal, venting to a confidant, etc.)

## 2020-11-02 ENCOUNTER — OFFICE VISIT (OUTPATIENT)
Dept: FAMILY MEDICINE CLINIC | Facility: CLINIC | Age: 35
End: 2020-11-02

## 2020-11-02 VITALS
OXYGEN SATURATION: 98 % | DIASTOLIC BLOOD PRESSURE: 70 MMHG | WEIGHT: 239 LBS | HEART RATE: 102 BPM | TEMPERATURE: 96.6 F | SYSTOLIC BLOOD PRESSURE: 130 MMHG | BODY MASS INDEX: 37.51 KG/M2 | HEIGHT: 67 IN

## 2020-11-02 DIAGNOSIS — M25.561 CHRONIC PAIN OF BOTH KNEES: ICD-10-CM

## 2020-11-02 DIAGNOSIS — E66.09 CLASS 2 OBESITY DUE TO EXCESS CALORIES WITHOUT SERIOUS COMORBIDITY WITH BODY MASS INDEX (BMI) OF 38.0 TO 38.9 IN ADULT: ICD-10-CM

## 2020-11-02 DIAGNOSIS — G89.29 CHRONIC PAIN OF BOTH KNEES: ICD-10-CM

## 2020-11-02 DIAGNOSIS — M25.562 CHRONIC PAIN OF BOTH KNEES: ICD-10-CM

## 2020-11-02 DIAGNOSIS — Z23 ENCOUNTER FOR IMMUNIZATION: ICD-10-CM

## 2020-11-02 DIAGNOSIS — Z11.59 NEED FOR HEPATITIS C SCREENING TEST: Primary | ICD-10-CM

## 2020-11-02 PROCEDURE — 99214 OFFICE O/P EST MOD 30 MIN: CPT | Performed by: NURSE PRACTITIONER

## 2020-11-02 PROCEDURE — 90732 PPSV23 VACC 2 YRS+ SUBQ/IM: CPT | Performed by: NURSE PRACTITIONER

## 2020-11-02 PROCEDURE — 90471 IMMUNIZATION ADMIN: CPT | Performed by: NURSE PRACTITIONER

## 2020-11-02 RX ORDER — PHENTERMINE HYDROCHLORIDE 37.5 MG/1
37.5 TABLET ORAL
Qty: 30 TABLET | Refills: 0 | Status: SHIPPED | OUTPATIENT
Start: 2020-11-02 | End: 2020-12-02 | Stop reason: SDUPTHER

## 2020-11-02 RX ORDER — GABAPENTIN 100 MG/1
100 CAPSULE ORAL EVERY 8 HOURS SCHEDULED
Qty: 90 CAPSULE | Refills: 2 | Status: SHIPPED | OUTPATIENT
Start: 2020-11-02 | End: 2021-03-09 | Stop reason: SDUPTHER

## 2020-11-02 NOTE — PROGRESS NOTES
Cristine Mar is a 34 y.o. male.     Chief Complaint   Patient presents with   • Obesity       History of Present Illness     Obesity-chronic and ongoing.  Patient is here for weight loss observation today. Noted weight loss off 4 pounds since his last visit.  He is tolerating Adipex well.  No negative side effects.  He has cut down on soda.  Increased water.  Has cut out fried foods.  He has been trying to use air fryer more.    Joint pain-chronic and ongoing.  On Nabumetone 500 mg and on Zanaflex.  He reports weather change has increased his knee pain.  Some increase in pain and swelling.  He has not been to pain management due to COVID.  He reports they called on his last appt and told him just before his appt he needed to drive to [place].  He request a new referral.  He reports he has been out of meds for months.    Routine health maintenance-due for updated labs.  Would like to RTC to have done.  He is interested in a pneumonia vaccine today.      The following portions of the patient's history were reviewed and updated as appropriate: CC, ROS, allergies, current medications, past family history, past medical history, past social history, past surgical history and problem list.    Review of Systems   Constitutional: Positive for fatigue. Negative for appetite change, unexpected weight gain and unexpected weight loss.   HENT: Negative for congestion, ear pain, postnasal drip, rhinorrhea, sore throat, swollen glands, trouble swallowing and voice change.    Eyes: Negative for pain and visual disturbance.   Respiratory: Negative for cough, chest tightness, shortness of breath and wheezing.    Cardiovascular: Negative for chest pain, palpitations and leg swelling.   Gastrointestinal: Negative for abdominal pain, blood in stool, constipation, diarrhea, nausea and indigestion.   Genitourinary: Negative for dysuria, hematuria and urgency.   Musculoskeletal: Positive for arthralgias and back pain. Negative for  "gait problem, joint swelling and myalgias.   Skin: Negative for color change and skin lesions.   Allergic/Immunologic: Negative.    Neurological: Negative for dizziness, numbness, headache and confusion.   Hematological: Negative.    Psychiatric/Behavioral: Positive for sleep disturbance. Negative for dysphoric mood and suicidal ideas. The patient is not nervous/anxious.    All other systems reviewed and are negative.      Objective     /70 (BP Location: Left arm, Patient Position: Sitting, Cuff Size: Adult)   Pulse 102   Temp 96.6 °F (35.9 °C) (Temporal)   Ht 170.2 cm (67.01\")   Wt 108 kg (239 lb)   SpO2 98%   BMI 37.42 kg/m²     Physical Exam  Vitals signs reviewed.   Constitutional:       General: He is not in acute distress.     Appearance: He is well-developed. He is not diaphoretic.   HENT:      Head: Normocephalic and atraumatic.      Comments: Oropharynx not examined.  Patient is presently wearing a face covering/mask due to COVID-19 pandemic.     Right Ear: Hearing, tympanic membrane, ear canal and external ear normal.      Left Ear: Hearing, tympanic membrane, ear canal and external ear normal.   Eyes:      General: Lids are normal. No scleral icterus.     Extraocular Movements:      Right eye: Normal extraocular motion and no nystagmus.      Left eye: Normal extraocular motion and no nystagmus.      Conjunctiva/sclera: Conjunctivae normal.      Pupils: Pupils are equal, round, and reactive to light.   Neck:      Musculoskeletal: Normal range of motion and neck supple.      Thyroid: No thyromegaly.      Vascular: No carotid bruit or JVD.      Trachea: No tracheal tenderness.   Cardiovascular:      Rate and Rhythm: Normal rate and regular rhythm.      Heart sounds: Normal heart sounds, S1 normal and S2 normal. No murmur.   Pulmonary:      Effort: Pulmonary effort is normal.      Breath sounds: Normal breath sounds.   Chest:      Chest wall: No tenderness.   Abdominal:      General: Bowel sounds " are normal.      Palpations: Abdomen is soft. There is no mass.      Tenderness: There is no abdominal tenderness.   Musculoskeletal: Normal range of motion.      Right knee: Tenderness found. Medial joint line and lateral joint line tenderness noted.      Left knee: Tenderness found. Medial joint line and lateral joint line tenderness noted.      Lumbar back: He exhibits tenderness.      Right lower leg: No edema.      Left lower leg: No edema.      Comments: Gait antalgic.   equal bilaterally. No muscular atrophy or flaccidity.   Lymphadenopathy:      Cervical: No cervical adenopathy.      Right cervical: No superficial cervical adenopathy.     Left cervical: No superficial cervical adenopathy.   Skin:     General: Skin is warm and dry.      Capillary Refill: Capillary refill takes less than 2 seconds.      Coloration: Skin is not pale.      Findings: No erythema.      Nails: There is no clubbing.     Neurological:      Mental Status: He is alert and oriented to person, place, and time.      Cranial Nerves: No cranial nerve deficit.      Sensory: No sensory deficit.      Motor: No tremor, atrophy or abnormal muscle tone.      Coordination: Coordination normal.      Gait: Gait normal.      Deep Tendon Reflexes: Reflexes are normal and symmetric.   Psychiatric:         Attention and Perception: He is attentive.         Mood and Affect: Mood normal.         Speech: Speech normal.         Behavior: Behavior normal.         Thought Content: Thought content normal.         Judgment: Judgment normal.         Assessment/Plan     Problem List Items Addressed This Visit        Digestive    Class 2 obesity due to excess calories without serious comorbidity with body mass index (BMI) of 38.0 to 38.9 in adult    Relevant Medications    phentermine (Adipex-P) 37.5 MG tablet    Other Relevant Orders    CBC & Differential    Comprehensive Metabolic Panel    Lipid Panel    TSH    Hemoglobin A1c    Vitamin D 25 Hydroxy    T4,  Free    Vitamin B12       Musculoskeletal and Integument    Chronic pain of both knees    Relevant Medications    gabapentin (NEURONTIN) 100 MG capsule      Other Visit Diagnoses     Need for hepatitis C screening test    -  Primary    Relevant Orders    Hepatitis C Antibody    Encounter for immunization        Relevant Orders    Pneumococcal Polysaccharide Vaccine 23-Valent Greater Than or Equal To 3yo Subcutaneous / IM (Completed)          Patient's Body mass index is 37.42 kg/m². BMI is above normal parameters. Recommendations include: nutrition counseling and pharmacological intervention.       Understands disease processes and need for medications.  Understands reasons for urgent and emergent care.  Patient (& family) verbalized agreement for treatment plan.   Emotional support and active listening provided.  Patient provided time to verbalize feelings.    LAURI reviewed today and consistent.  Will refill prescribed controlled medication today.  Patient is aware they cannot receive narcotics from any other provider except if under care of pain management or speciality clinic.  Risk and benefits of medication use has been reviewed.  History and physical exam exhibit continued safe and appropriate use of controlled substances.  The patient is aware of the potential for addiction and dependence.  This patient has been made aware of the appropriate use of such medications, including potential risk of somnolence, limited ability to drive and / or work safely, and potential for overdose.  Patient understands not to take medication and drive until they know how medication may affect their cognition/decision making.   It has also been made clear that these medications are for use by this patient only, without concomitant use of alcohol or other substances unless prescribed/advised by medical provider.  Patient understands they may be subject to UDS and pill counts at random.    Patient considered to be low risk for  addiction due to use of single controlled medications.  Patient understands and accepts these risks.  Patient need for medication will be reassessed at each visit.  Doses will be adjusted according to patient need and findings.    Goal of TX: Patient will not have any adverse reactions of medication.  Patient will have reduction in weight with use of Adipex as directed with simultaneous diet and lifestyle changes.  Patient will have reduction in neuropathic symptoms of pain with use of gabapentin as directed.  Patient will not have any drug drug interactions.      Medication Dispense Information    Phentermine Hcl   Dispensed: 10/2/2020 12:00 AM   Written:  10/1/2020   Unit strength: 37.5MG   Days supply: 30   Dispense Note: GivenName=RAULITOYSurName=LOOPBirthDate=[id number]=[address] [facility] [address]   Quantity: 30 each   Pharmacy: [facility]   Authorizing provider: ENID LYLE   Received from: [facility] (Fill History)   Brand or Generic:         Medication Dispense Information    Gabapentin   Dispensed: 7/1/2020 12:00 AM   Written:  3/26/2020   Unit strength: 100MG   Days supply: 30   Dispense Note: GivenName=RAULITOYSurName=LOOPBirthDate=[id number]=[address] [place], [place], [address]   Quantity: 90 each   Pharmacy: [facility]   Authorizing provider: ENID LYLE   Received from: [facility] (Fill History)   Brand or Generic:       Fasting labs ordered.  Patient will RTC to have done.   Pneumonia vaccine given today.     RTC 1 month, sooner if needed.             This document has been electronically signed by:  ARTUR Brothers FNP-C Dragon disclaimer:  Much of this encounter note is an electronic transcription/translation of spoken language to printed text. The electronic translation of spoken language may permit erroneous, or at times, nonsensical words or phrases to be inadvertently transcribed; Although I have reviewed the  note for such errors, some may still exist.

## 2020-11-16 DIAGNOSIS — F41.8 INSOMNIA SECONDARY TO DEPRESSION WITH ANXIETY: ICD-10-CM

## 2020-11-16 DIAGNOSIS — M25.562 CHRONIC PAIN OF BOTH KNEES: ICD-10-CM

## 2020-11-16 DIAGNOSIS — M25.561 CHRONIC PAIN OF BOTH KNEES: ICD-10-CM

## 2020-11-16 DIAGNOSIS — F51.05 INSOMNIA SECONDARY TO DEPRESSION WITH ANXIETY: ICD-10-CM

## 2020-11-16 DIAGNOSIS — G89.29 CHRONIC PAIN OF BOTH KNEES: ICD-10-CM

## 2020-11-16 RX ORDER — TIZANIDINE 4 MG/1
TABLET ORAL
Qty: 60 TABLET | Refills: 5 | Status: SHIPPED | OUTPATIENT
Start: 2020-11-16 | End: 2021-01-30 | Stop reason: SDUPTHER

## 2020-11-16 RX ORDER — NORTRIPTYLINE HYDROCHLORIDE 25 MG/1
25-50 CAPSULE ORAL NIGHTLY
Qty: 60 CAPSULE | Refills: 2 | Status: SHIPPED | OUTPATIENT
Start: 2020-11-16 | End: 2021-01-30 | Stop reason: SDUPTHER

## 2020-11-17 ENCOUNTER — LAB (OUTPATIENT)
Dept: FAMILY MEDICINE CLINIC | Facility: CLINIC | Age: 35
End: 2020-11-17

## 2020-11-17 LAB
25(OH)D3 SERPL-MCNC: 17 NG/ML (ref 30–100)
ALBUMIN SERPL-MCNC: 4.3 G/DL (ref 3.5–5.2)
ALBUMIN/GLOB SERPL: 1.3 G/DL
ALP SERPL-CCNC: 93 U/L (ref 39–117)
ALT SERPL W P-5'-P-CCNC: 27 U/L (ref 1–41)
ANION GAP SERPL CALCULATED.3IONS-SCNC: 9.5 MMOL/L (ref 5–15)
ARTICHOKE IGE QN: 76 MG/DL (ref 0–100)
AST SERPL-CCNC: 16 U/L (ref 1–40)
BASOPHILS # BLD AUTO: 0.03 10*3/MM3 (ref 0–0.2)
BASOPHILS NFR BLD AUTO: 0.5 % (ref 0–1.5)
BILIRUB SERPL-MCNC: 0.4 MG/DL (ref 0–1.2)
BUN SERPL-MCNC: 10 MG/DL (ref 6–20)
BUN/CREAT SERPL: 13.2 (ref 7–25)
CALCIUM SPEC-SCNC: 9.8 MG/DL (ref 8.6–10.5)
CHLORIDE SERPL-SCNC: 101 MMOL/L (ref 98–107)
CHOLEST SERPL-MCNC: 303 MG/DL (ref 0–200)
CO2 SERPL-SCNC: 26.5 MMOL/L (ref 22–29)
CREAT SERPL-MCNC: 0.76 MG/DL (ref 0.76–1.27)
DEPRECATED RDW RBC AUTO: 41.2 FL (ref 37–54)
EOSINOPHIL # BLD AUTO: 0.15 10*3/MM3 (ref 0–0.4)
EOSINOPHIL NFR BLD AUTO: 2.6 % (ref 0.3–6.2)
ERYTHROCYTE [DISTWIDTH] IN BLOOD BY AUTOMATED COUNT: 12.8 % (ref 12.3–15.4)
GFR SERPL CREATININE-BSD FRML MDRD: 117 ML/MIN/1.73
GLOBULIN UR ELPH-MCNC: 3.2 GM/DL
GLUCOSE SERPL-MCNC: 95 MG/DL (ref 65–99)
HBA1C MFR BLD: 4.79 % (ref 4.8–5.6)
HCT VFR BLD AUTO: 47 % (ref 37.5–51)
HCV AB SER DONR QL: NORMAL
HDLC SERPL-MCNC: ABNORMAL MG/DL
HGB BLD-MCNC: 16.8 G/DL (ref 13–17.7)
IMM GRANULOCYTES # BLD AUTO: 0.03 10*3/MM3 (ref 0–0.05)
IMM GRANULOCYTES NFR BLD AUTO: 0.5 % (ref 0–0.5)
LDLC SERPL CALC-MCNC: ABNORMAL MG/DL
LDLC/HDLC SERPL: ABNORMAL {RATIO}
LYMPHOCYTES # BLD AUTO: 2.35 10*3/MM3 (ref 0.7–3.1)
LYMPHOCYTES NFR BLD AUTO: 40.5 % (ref 19.6–45.3)
MCH RBC QN AUTO: 31.8 PG (ref 26.6–33)
MCHC RBC AUTO-ENTMCNC: 35.7 G/DL (ref 31.5–35.7)
MCV RBC AUTO: 88.8 FL (ref 79–97)
MONOCYTES # BLD AUTO: 0.4 10*3/MM3 (ref 0.1–0.9)
MONOCYTES NFR BLD AUTO: 6.9 % (ref 5–12)
NEUTROPHILS NFR BLD AUTO: 2.84 10*3/MM3 (ref 1.7–7)
NEUTROPHILS NFR BLD AUTO: 49 % (ref 42.7–76)
NRBC BLD AUTO-RTO: 0 /100 WBC (ref 0–0.2)
PLATELET # BLD AUTO: 231 10*3/MM3 (ref 140–450)
PMV BLD AUTO: 10.8 FL (ref 6–12)
POTASSIUM SERPL-SCNC: 4.1 MMOL/L (ref 3.5–5.2)
PROT SERPL-MCNC: 7.5 G/DL (ref 6–8.5)
RBC # BLD AUTO: 5.29 10*6/MM3 (ref 4.14–5.8)
SODIUM SERPL-SCNC: 137 MMOL/L (ref 136–145)
T4 FREE SERPL-MCNC: 1.01 NG/DL (ref 0.93–1.7)
TRIGL SERPL-MCNC: 1756 MG/DL (ref 0–150)
TSH SERPL DL<=0.05 MIU/L-ACNC: 1.91 UIU/ML (ref 0.27–4.2)
VIT B12 BLD-MCNC: 330 PG/ML (ref 211–946)
VLDLC SERPL-MCNC: ABNORMAL MG/DL
WBC # BLD AUTO: 5.8 10*3/MM3 (ref 3.4–10.8)

## 2020-11-17 PROCEDURE — 82607 VITAMIN B-12: CPT | Performed by: NURSE PRACTITIONER

## 2020-11-17 PROCEDURE — 85025 COMPLETE CBC W/AUTO DIFF WBC: CPT | Performed by: NURSE PRACTITIONER

## 2020-11-17 PROCEDURE — 86803 HEPATITIS C AB TEST: CPT | Performed by: NURSE PRACTITIONER

## 2020-11-17 PROCEDURE — 84443 ASSAY THYROID STIM HORMONE: CPT | Performed by: NURSE PRACTITIONER

## 2020-11-17 PROCEDURE — 84439 ASSAY OF FREE THYROXINE: CPT | Performed by: NURSE PRACTITIONER

## 2020-11-17 PROCEDURE — 83036 HEMOGLOBIN GLYCOSYLATED A1C: CPT | Performed by: NURSE PRACTITIONER

## 2020-11-17 PROCEDURE — 80053 COMPREHEN METABOLIC PANEL: CPT | Performed by: NURSE PRACTITIONER

## 2020-11-17 PROCEDURE — 82306 VITAMIN D 25 HYDROXY: CPT | Performed by: NURSE PRACTITIONER

## 2020-11-17 PROCEDURE — 80061 LIPID PANEL: CPT | Performed by: NURSE PRACTITIONER

## 2020-11-17 PROCEDURE — 83721 ASSAY OF BLOOD LIPOPROTEIN: CPT | Performed by: NURSE PRACTITIONER

## 2020-11-23 NOTE — PROGRESS NOTES
All of his labs are good except his cholesterol.  His cholesterol is 303 and his triglycerides are greater than 1000.  His vitamin D is also minimally low.  I will be sending medication to his pharmacy to get his cholesterol down.  Risk of increased cholesterol can be heart attack and stroke symptoms.

## 2020-12-02 ENCOUNTER — OFFICE VISIT (OUTPATIENT)
Dept: FAMILY MEDICINE CLINIC | Facility: CLINIC | Age: 35
End: 2020-12-02

## 2020-12-02 VITALS
TEMPERATURE: 97.1 F | OXYGEN SATURATION: 98 % | SYSTOLIC BLOOD PRESSURE: 136 MMHG | DIASTOLIC BLOOD PRESSURE: 84 MMHG | BODY MASS INDEX: 37.1 KG/M2 | WEIGHT: 236.4 LBS | HEART RATE: 110 BPM | HEIGHT: 67 IN

## 2020-12-02 DIAGNOSIS — M25.562 CHRONIC PAIN OF BOTH KNEES: ICD-10-CM

## 2020-12-02 DIAGNOSIS — E78.2 MIXED HYPERLIPIDEMIA: ICD-10-CM

## 2020-12-02 DIAGNOSIS — E66.09 CLASS 2 OBESITY DUE TO EXCESS CALORIES WITHOUT SERIOUS COMORBIDITY WITH BODY MASS INDEX (BMI) OF 38.0 TO 38.9 IN ADULT: Primary | ICD-10-CM

## 2020-12-02 DIAGNOSIS — G89.29 CHRONIC PAIN OF BOTH KNEES: ICD-10-CM

## 2020-12-02 DIAGNOSIS — M25.561 CHRONIC PAIN OF BOTH KNEES: ICD-10-CM

## 2020-12-02 PROCEDURE — 99214 OFFICE O/P EST MOD 30 MIN: CPT | Performed by: NURSE PRACTITIONER

## 2020-12-02 RX ORDER — NABUMETONE 500 MG/1
500 TABLET, FILM COATED ORAL 2 TIMES DAILY
Qty: 60 TABLET | Refills: 5 | Status: SHIPPED | OUTPATIENT
Start: 2020-12-02 | End: 2021-01-30 | Stop reason: SDUPTHER

## 2020-12-02 RX ORDER — PHENTERMINE HYDROCHLORIDE 37.5 MG/1
37.5 TABLET ORAL
Qty: 30 TABLET | Refills: 0 | Status: SHIPPED | OUTPATIENT
Start: 2020-12-02 | End: 2021-03-09

## 2020-12-02 RX ORDER — ATORVASTATIN CALCIUM 20 MG/1
20 TABLET, FILM COATED ORAL DAILY
Qty: 30 TABLET | Refills: 5 | Status: SHIPPED | OUTPATIENT
Start: 2020-12-02 | End: 2021-01-30 | Stop reason: SDUPTHER

## 2020-12-02 NOTE — PROGRESS NOTES
"Cristine Mar is a 35 y.o. male.     Chief Complaint   Patient presents with   • Obesity       History of Present Illness     Obesity-noted to be down approx 3#.  Initially weighed in Steel toed shoes. He reports he did eat \"too much\" at recent holiday.  Continues to try to be active.   Hyperlipidemia-not on meds.  Noted to have elevated cholesterol and trigs on most recent labs.  He has been trying to watch his diet better.  Is trying to be more active and is actively working on weight loss.    Lab Results   Component Value Date    CHOL 303 (H) 11/17/2020    TRIG 1,756 (H) 11/17/2020    HDL  11/17/2020      Comment:      Unable to perform due to elevated triglyceride.     LDL  11/17/2020      Comment:      Unable to calculate    LDL 76 11/17/2020     Joint pain-doing well on Relafen and Gabapentin.  No negative side effects.  Tolerating meds well.  Reports he has been walking more and is tolerating the increased activity.  Continues to have some overuse muscle stiffness but \"is doing better\" overall.        The following portions of the patient's history were reviewed and updated as appropriate: CC, ROS, allergies, current medications, past family history, past medical history, past social history, past surgical history and problem list.      Review of Systems   Constitutional: Positive for fatigue. Negative for appetite change, unexpected weight gain and unexpected weight loss.   HENT: Negative for congestion, ear pain, postnasal drip, rhinorrhea, sore throat, swollen glands, trouble swallowing and voice change.    Eyes: Negative for pain and visual disturbance.   Respiratory: Negative for cough, chest tightness, shortness of breath and wheezing.    Cardiovascular: Negative for chest pain, palpitations and leg swelling.   Gastrointestinal: Negative for abdominal pain, blood in stool, constipation, diarrhea, nausea and indigestion.   Genitourinary: Negative for dysuria, hematuria and urgency.   " "  Musculoskeletal: Positive for arthralgias. Negative for back pain, gait problem, joint swelling and myalgias.   Skin: Negative for color change and skin lesions.   Allergic/Immunologic: Negative.    Neurological: Negative for numbness, headache and confusion.   Hematological: Negative.    Psychiatric/Behavioral: Negative for dysphoric mood, sleep disturbance and suicidal ideas. The patient is not nervous/anxious.    All other systems reviewed and are negative.      Objective     /84   Pulse 110   Temp 97.1 °F (36.2 °C) (Temporal)   Ht 170.2 cm (67\")   Wt 107 kg (236 lb 6.4 oz)   SpO2 98%   BMI 37.03 kg/m²     Physical Exam  Vitals signs reviewed.   Constitutional:       General: He is not in acute distress.     Appearance: He is well-developed. He is obese. He is not diaphoretic.   HENT:      Head: Normocephalic and atraumatic.      Comments: Oropharynx not examined.  Patient is presently wearing a face covering/mask due to COVID-19 pandemic.     Right Ear: Hearing, tympanic membrane, ear canal and external ear normal.      Left Ear: Hearing, tympanic membrane, ear canal and external ear normal.   Eyes:      General: Lids are normal. No scleral icterus.     Extraocular Movements:      Right eye: Normal extraocular motion and no nystagmus.      Left eye: Normal extraocular motion and no nystagmus.      Conjunctiva/sclera: Conjunctivae normal.      Pupils: Pupils are equal, round, and reactive to light.   Neck:      Musculoskeletal: Normal range of motion and neck supple.      Thyroid: No thyromegaly.      Vascular: No carotid bruit or JVD.      Trachea: No tracheal tenderness.   Cardiovascular:      Rate and Rhythm: Normal rate and regular rhythm.      Heart sounds: Normal heart sounds, S1 normal and S2 normal. No murmur.   Pulmonary:      Effort: Pulmonary effort is normal.      Breath sounds: Normal breath sounds.   Chest:      Chest wall: No tenderness.   Abdominal:      General: Bowel sounds are " normal.      Palpations: Abdomen is soft. There is no hepatomegaly, splenomegaly or mass.      Tenderness: There is no abdominal tenderness.   Musculoskeletal:      Right knee: He exhibits decreased range of motion. Tenderness found.      Left knee: He exhibits decreased range of motion. Tenderness found.      Right lower leg: No edema.      Left lower leg: No edema.      Comments: Gait normal.   equal bilaterally. No muscular atrophy or flaccidity.  Generalized areas of joint stiffness.    Lymphadenopathy:      Cervical: No cervical adenopathy.      Right cervical: No superficial cervical adenopathy.     Left cervical: No superficial cervical adenopathy.   Skin:     General: Skin is warm and dry.      Capillary Refill: Capillary refill takes less than 2 seconds.      Coloration: Skin is not pale.      Findings: No erythema.      Nails: There is no clubbing.     Neurological:      Mental Status: He is alert and oriented to person, place, and time.      Cranial Nerves: No cranial nerve deficit.      Sensory: No sensory deficit.      Motor: No tremor, atrophy or abnormal muscle tone.      Coordination: Coordination normal.      Deep Tendon Reflexes: Reflexes are normal and symmetric.   Psychiatric:         Attention and Perception: He is attentive.         Mood and Affect: Mood normal.         Speech: Speech normal.         Behavior: Behavior normal.         Thought Content: Thought content normal.         Judgment: Judgment normal.         Assessment/Plan     Diagnoses and all orders for this visit:    1. Class 2 obesity due to excess calories without serious comorbidity with body mass index (BMI) of 38.0 to 38.9 in adult (Primary)  Assessment & Plan:  Dietary counseling provided:  Healthy food choices including fruits and vegetables, limit soda and junk foods, adequate water intake.  Increase in physical activity advised at least 30 minutes per day 3-5 days per week.  Meds as directed.     Orders:  -      phentermine (Adipex-P) 37.5 MG tablet; Take 1 tablet by mouth Every Morning Before Breakfast.  Dispense: 30 tablet; Refill: 0    2. Mixed hyperlipidemia  -     atorvastatin (LIPITOR) 20 MG tablet; Take 1 tablet by mouth Daily.  Dispense: 30 tablet; Refill: 5    3. Chronic pain of both knees  -     nabumetone (RELAFEN) 500 MG tablet; Take 1 tablet by mouth 2 (Two) Times a Day.  Dispense: 60 tablet; Refill: 5        Patient's Body mass index is 37.03 kg/m². BMI is above normal parameters. Recommendations include: nutrition counseling.       Understands disease processes and need for medications.  Understands reasons for urgent and emergent care.  Patient (& family) verbalized agreement for treatment plan.   Emotional support and active listening provided.  Patient provided time to verbalize feelings.    LAURI reviewed today and consistent.  Will refill prescribed controlled medication today.  Patient is aware they cannot receive narcotics from any other provider except if under care of pain management or speciality clinic.  Risk and benefits of medication use has been reviewed.  History and physical exam exhibit continued safe and appropriate use of controlled substances.  The patient is aware of the potential for addiction and dependence.  This patient has been made aware of the appropriate use of such medications, including potential risk of somnolence, limited ability to drive and / or work safely, and potential for overdose.    It has also been made clear that these medications are for use by this patient only, without concomitant use of alcohol or other substances unless prescribed/advised by medical provider.  Patient understands they may be subject to UDS and pill counts at random.      Patient considered to be moderate risk for addiction due to use of multiple controlled medications.  Patient understands and accepts these risks.  Patient need for medication will be reassessed at each visit.  Doses will be  adjusted according to patient need and findings.    Goal of TX: Patient will not have any adverse reactions of medication.  Patient will have reduction in weight with use of Adipex as directed with simultaneous diet and lifestyle changes.  Patient will have reduction in neuropathic symptoms of pain with use of gabapentin as directed.  Patient will not have any drug drug interactions.    Medication Dispense Information    Phentermine Hcl   Dispensed: 11/3/2020 12:00 AM   Written:  11/2/2020   Unit strength: 37.5MG   Days supply: 30   Dispense Note: GivenName=RAULITOYSurName=LOOPBirthDate=[id number]=[address] [place], [place], [address]   Quantity: 30 each   Pharmacy: [facility]   Authorizing provider: ENID LYLE   Received from: [facility] (Fill History)   Brand or Generic:       Medication Dispense Information    Gabapentin   Dispensed: 11/3/2020 12:00 AM   Written:  11/2/2020   Unit strength: 100MG   Days supply: 30   Dispense Note: GivenName=RAULITOYSurName=LOOPBirthDate=[id number]=[address] [place], [place], [address]   Quantity: 90 each   Pharmacy: [facility]   Authorizing provider: ENID LYLE   Received from: [facility] (Fill History)   Brand or Generic:       Reviewed most recent labs with patient.  Discussed any abnormal findings.  Patient's questions answered.    RTC 1 month, sooner if needed.           This document has been electronically signed by:  ARTUR Brothers FNP-C Dragon disclaimer:  Much of this encounter note is an electronic transcription/translation of spoken language to printed text. The electronic translation of spoken language may permit erroneous, or at times, nonsensical words or phrases to be inadvertently transcribed; Although I have reviewed the note for such errors, some may still exist.

## 2020-12-02 NOTE — ASSESSMENT & PLAN NOTE
Dietary counseling provided:  Healthy food choices including fruits and vegetables, limit soda and junk foods, adequate water intake.  Increase in physical activity advised at least 30 minutes per day 3-5 days per week.  Meds as directed.

## 2020-12-07 ENCOUNTER — OFFICE VISIT (OUTPATIENT)
Dept: FAMILY MEDICINE CLINIC | Facility: CLINIC | Age: 35
End: 2020-12-07

## 2020-12-07 ENCOUNTER — LAB (OUTPATIENT)
Dept: FAMILY MEDICINE CLINIC | Facility: CLINIC | Age: 35
End: 2020-12-07

## 2020-12-07 DIAGNOSIS — Z20.822 EXPOSURE TO COVID-19 VIRUS: Primary | ICD-10-CM

## 2020-12-07 PROCEDURE — 99441 PR PHYS/QHP TELEPHONE EVALUATION 5-10 MIN: CPT | Performed by: NURSE PRACTITIONER

## 2020-12-07 PROCEDURE — U0004 COV-19 TEST NON-CDC HGH THRU: HCPCS | Performed by: NURSE PRACTITIONER

## 2020-12-07 NOTE — PROGRESS NOTES
You have chosen to receive care through a telephone visit today. Do you consent to use a telephone visit for your medical care today? Yes    Establish patient makes contact with office of [facility] to discuss health conditions.  Patient is due for routine checkup but due to current pandemic is not recommended to present to the office for face-to-face evaluation.      Chief Complaint   Patient presents with   • COVID Exposure       Brief HPI/ROS obtained as follows:    COVID exposure-Occurred on Friday at work.  He reports his work wants him to be tested before he can return to work.  He is self quarantining at home due to his kids and an elder person who lives.  He reports he is not having any symptoms other than rhinorrhea.  He reports no loss of taste or smell.  No Fever.  No cough.  No SOA.      The following portions of the patient's history were reviewed and updated as appropriate: CC, ROS, allergies, current medications, past family history, past medical history, past social history, past surgical history and problem list.    Review of Systems   Constitutional: Negative for appetite change, fatigue, unexpected weight gain and unexpected weight loss.   HENT: Positive for rhinorrhea. Negative for congestion, ear pain, postnasal drip, sore throat, swollen glands, trouble swallowing and voice change.    Eyes: Negative for pain and visual disturbance.   Respiratory: Negative for cough, chest tightness, shortness of breath and wheezing.    Cardiovascular: Negative for chest pain and palpitations.   Gastrointestinal: Negative for abdominal pain, blood in stool, constipation, diarrhea, nausea and indigestion.   Genitourinary: Negative for dysuria, hematuria and urgency.   Musculoskeletal: Negative for arthralgias and back pain.   Skin: Negative for color change and skin lesions.   Allergic/Immunologic: Negative.    Neurological: Negative for dizziness, numbness, headache and confusion.   Hematological: Negative.     Psychiatric/Behavioral: Negative for sleep disturbance and suicidal ideas.   All other systems reviewed and are negative.      Assessment     Physical Exam     Patient alert and oriented.  Able to follow conversation without sounding breathless.  No obvious distress.  Thought processes congruent with conversation.  Answers and ask questions without difficulty.      Assessment     Problem List Items Addressed This Visit     None      Visit Diagnoses     Exposure to COVID-19 virus    -  Primary    Relevant Orders    COVID-19 PCR, [facility], NP SWAB IN [facility] TRANSPORT MEDIA 24-30 HR TAT - Swab, Nasopharynx          Patient instructed and advised to call if symptoms are increasing or new symptoms occur.    Understands reasons for urgent and emergent care.  Patient (& family) verbalized agreement for treatment plan.   Generalized precautions advised including social distancing, hand washing, cough/sneeze hygiene.     COVID Test today.     RTC PRN 3-5 days for worsening or non resolving symptoms      This visit has been rescheduled as a phone visit to comply with patient safety concerns in accordance with CDC recommendations. Total time of discussion was 8 minutes.        This document has been electronically signed by:  ARTUR Brothers, FNP-C    Dragon disclaimer:  Much of this encounter note is an electronic transcription/translation of spoken language to printed text. The electronic translation of spoken language may permit erroneous, or at times, nonsensical words or phrases to be inadvertently transcribed; Although I have reviewed the note for such errors, some may still exist.

## 2020-12-08 LAB — SARS-COV-2 RNA RESP QL NAA+PROBE: NOT DETECTED

## 2020-12-18 ENCOUNTER — OFFICE VISIT (OUTPATIENT)
Dept: FAMILY MEDICINE CLINIC | Facility: CLINIC | Age: 35
End: 2020-12-18

## 2020-12-18 ENCOUNTER — LAB (OUTPATIENT)
Dept: FAMILY MEDICINE CLINIC | Facility: CLINIC | Age: 35
End: 2020-12-18

## 2020-12-18 VITALS — HEIGHT: 67 IN | BODY MASS INDEX: 35.79 KG/M2 | WEIGHT: 228 LBS

## 2020-12-18 DIAGNOSIS — J40 BRONCHITIS: Primary | ICD-10-CM

## 2020-12-18 DIAGNOSIS — R50.9 FEVER, UNSPECIFIED FEVER CAUSE: ICD-10-CM

## 2020-12-18 DIAGNOSIS — R05.9 COUGH: ICD-10-CM

## 2020-12-18 PROCEDURE — U0004 COV-19 TEST NON-CDC HGH THRU: HCPCS | Performed by: PHYSICIAN ASSISTANT

## 2020-12-18 PROCEDURE — 99213 OFFICE O/P EST LOW 20 MIN: CPT | Performed by: PHYSICIAN ASSISTANT

## 2020-12-18 RX ORDER — ALBUTEROL SULFATE 2.5 MG/3ML
2.5 SOLUTION RESPIRATORY (INHALATION) EVERY 4 HOURS PRN
Qty: 180 VIAL | Refills: 5 | Status: SHIPPED | OUTPATIENT
Start: 2020-12-18 | End: 2021-10-11

## 2020-12-18 RX ORDER — AMOXICILLIN AND CLAVULANATE POTASSIUM 875; 125 MG/1; MG/1
1 TABLET, FILM COATED ORAL 2 TIMES DAILY
Qty: 20 TABLET | Refills: 0 | Status: SHIPPED | OUTPATIENT
Start: 2020-12-18 | End: 2021-04-26

## 2020-12-19 LAB — SARS-COV-2 RNA RESP QL NAA+PROBE: NOT DETECTED

## 2020-12-29 NOTE — PATIENT INSTRUCTIONS

## 2020-12-29 NOTE — PROGRESS NOTES
Cristine Mar is a 35 y.o. male.     Video visit    You have chosen to receive care through a telehealth visit.  Do you consent to use a video/audio connection for your medical care today? Yes      Chief Complaint -cough    History of Present Illness -       Cough-  He complains of productive cough, runny nose and shortness of breath with associated chest congestion for the past 3 days.  He does report associated fever.  He states that he works at [employer] and was exposed to coworkers who were positive for Covid.  Patient states that he has been tested for Covid last week and Covid testing was negative.  Patient states that he does smoke half pack per day for the past 15+ years.    The following portions of the patient's history were reviewed and updated as appropriate: allergies, current medications, past family history, past medical history, past social history, past surgical history and problem list.    Review of Systems   Constitutional: Positive for appetite change, fatigue and fever. Negative for activity change.   HENT: Positive for congestion and rhinorrhea. Negative for ear pain, sinus pressure and sore throat.    Eyes: Negative for pain and visual disturbance.   Respiratory: Positive for cough and shortness of breath. Negative for chest tightness and wheezing.    Cardiovascular: Negative for chest pain and palpitations.   Gastrointestinal: Negative for abdominal pain, constipation, diarrhea, nausea and vomiting.   Endocrine: Negative for polydipsia and polyuria.   Genitourinary: Negative for dysuria and frequency.   Musculoskeletal: Negative for back pain and myalgias.   Skin: Negative for color change and rash.   Allergic/Immunologic: Negative for food allergies and immunocompromised state.   Neurological: Negative for dizziness, syncope and headaches.   Hematological: Negative for adenopathy. Does not bruise/bleed easily.   Psychiatric/Behavioral: Negative for hallucinations and suicidal  "ideas. The patient is not nervous/anxious.        Ht 170.2 cm (67\")   Wt 103 kg (228 lb)   BMI 35.71 kg/m²     Physical Exam  Vitals signs and nursing note reviewed.   Constitutional:       Appearance: He is well-developed. He is obese.      Comments: Patient appears lethargic   Eyes:      Extraocular Movements: Extraocular movements intact.      Conjunctiva/sclera: Conjunctivae normal.   Pulmonary:      Effort: Pulmonary effort is normal. No respiratory distress.      Breath sounds: Normal breath sounds. No wheezing.      Comments: Breath sounds appear normal without the use of accessory muscles or wheezing audible on video today  Skin:     General: Skin is dry.      Findings: No rash.   Neurological:      Mental Status: He is oriented to person, place, and time.   Psychiatric:         Mood and Affect: Mood normal.         Behavior: Behavior normal.         Thought Content: Thought content normal.         Assessment/Plan     Diagnoses and all orders for this visit:    1. Bronchitis (Primary)  Comments:  Cannot rule out Covid as this needs to be retested  Orders:  -     amoxicillin-clavulanate (Augmentin) 875-125 MG per tablet; Take 1 tablet by mouth 2 (Two) Times a Day.  Dispense: 20 tablet; Refill: 0  -     albuterol (PROVENTIL) (2.5 MG/3ML) 0.083% nebulizer solution; Take 2.5 mg by nebulization Every 4 (Four) Hours As Needed for Shortness of Air.  Dispense: 180 vial; Refill: 5    2. Cough  -     albuterol (PROVENTIL) (2.5 MG/3ML) 0.083% nebulizer solution; Take 2.5 mg by nebulization Every 4 (Four) Hours As Needed for Shortness of Air.  Dispense: 180 vial; Refill: 5    3. Fever, unspecified fever cause  -     COVID-19 PCR, [facility], NP SWAB IN [facility] VIRAL TRANSPORT MEDIA 24-30 HR TAT - Swab, Nasopharynx; Future      Acute bronchitis  Advised regarding symptomatic treatment.  Suggested OTC remedies.  Antibiotic as per orders.  Encouraged to report if any worse or if any new symptoms.  Call in 5 days if " symptoms aren't resolving.  Patient vies to quarantine for 10 days  Patient will come to the office today for nasopharyngeal Covid testing.  Patient was advised on smoking cessation.  Patient states that he will decrease smoking due to acute symptoms but is not interested in help with smoking cessation at this time.      This document has been electronically signed by:  Nafisa No PA-C

## 2021-01-30 DIAGNOSIS — E66.09 CLASS 2 OBESITY DUE TO EXCESS CALORIES WITHOUT SERIOUS COMORBIDITY WITH BODY MASS INDEX (BMI) OF 38.0 TO 38.9 IN ADULT: ICD-10-CM

## 2021-01-30 DIAGNOSIS — M25.561 CHRONIC PAIN OF BOTH KNEES: ICD-10-CM

## 2021-01-30 DIAGNOSIS — G89.29 CHRONIC PAIN OF BOTH KNEES: ICD-10-CM

## 2021-01-30 DIAGNOSIS — F51.05 INSOMNIA SECONDARY TO DEPRESSION WITH ANXIETY: ICD-10-CM

## 2021-01-30 DIAGNOSIS — M25.562 CHRONIC PAIN OF BOTH KNEES: ICD-10-CM

## 2021-01-30 DIAGNOSIS — E78.2 MIXED HYPERLIPIDEMIA: ICD-10-CM

## 2021-01-30 DIAGNOSIS — F41.8 INSOMNIA SECONDARY TO DEPRESSION WITH ANXIETY: ICD-10-CM

## 2021-02-01 DIAGNOSIS — R05.9 COUGH: ICD-10-CM

## 2021-02-01 DIAGNOSIS — J40 BRONCHITIS: ICD-10-CM

## 2021-02-01 RX ORDER — NABUMETONE 500 MG/1
500 TABLET, FILM COATED ORAL 2 TIMES DAILY
Qty: 60 TABLET | Refills: 5 | Status: SHIPPED | OUTPATIENT
Start: 2021-02-01 | End: 2021-10-20 | Stop reason: SDUPTHER

## 2021-02-01 RX ORDER — AMOXICILLIN AND CLAVULANATE POTASSIUM 875; 125 MG/1; MG/1
1 TABLET, FILM COATED ORAL 2 TIMES DAILY
Qty: 20 TABLET | Refills: 0 | OUTPATIENT
Start: 2021-02-01

## 2021-02-01 RX ORDER — ATORVASTATIN CALCIUM 20 MG/1
20 TABLET, FILM COATED ORAL DAILY
Qty: 30 TABLET | Refills: 5 | Status: SHIPPED | OUTPATIENT
Start: 2021-02-01 | End: 2021-10-20 | Stop reason: SDUPTHER

## 2021-02-01 RX ORDER — TIZANIDINE 4 MG/1
4 TABLET ORAL 2 TIMES DAILY PRN
Qty: 60 TABLET | Refills: 5 | Status: SHIPPED | OUTPATIENT
Start: 2021-02-01 | End: 2021-10-20 | Stop reason: SDUPTHER

## 2021-02-01 RX ORDER — PHENTERMINE HYDROCHLORIDE 37.5 MG/1
37.5 TABLET ORAL
Qty: 30 TABLET | Refills: 0 | OUTPATIENT
Start: 2021-02-01

## 2021-02-01 RX ORDER — GABAPENTIN 100 MG/1
100 CAPSULE ORAL EVERY 8 HOURS SCHEDULED
Qty: 90 CAPSULE | Refills: 2 | OUTPATIENT
Start: 2021-02-01

## 2021-02-01 RX ORDER — NORTRIPTYLINE HYDROCHLORIDE 25 MG/1
25-50 CAPSULE ORAL NIGHTLY
Qty: 60 CAPSULE | Refills: 2 | Status: SHIPPED | OUTPATIENT
Start: 2021-02-01 | End: 2021-03-09

## 2021-02-01 RX ORDER — ALBUTEROL SULFATE 2.5 MG/3ML
2.5 SOLUTION RESPIRATORY (INHALATION) EVERY 4 HOURS PRN
OUTPATIENT
Start: 2021-02-01

## 2021-03-09 ENCOUNTER — OFFICE VISIT (OUTPATIENT)
Dept: FAMILY MEDICINE CLINIC | Facility: CLINIC | Age: 36
End: 2021-03-09

## 2021-03-09 VITALS — WEIGHT: 228 LBS | BODY MASS INDEX: 35.79 KG/M2 | HEIGHT: 67 IN

## 2021-03-09 DIAGNOSIS — J06.9 ACUTE URI: ICD-10-CM

## 2021-03-09 DIAGNOSIS — F41.8 INSOMNIA SECONDARY TO DEPRESSION WITH ANXIETY: Primary | ICD-10-CM

## 2021-03-09 DIAGNOSIS — F51.05 INSOMNIA SECONDARY TO DEPRESSION WITH ANXIETY: Primary | ICD-10-CM

## 2021-03-09 DIAGNOSIS — M25.562 CHRONIC PAIN OF BOTH KNEES: ICD-10-CM

## 2021-03-09 DIAGNOSIS — G89.29 CHRONIC PAIN OF BOTH KNEES: ICD-10-CM

## 2021-03-09 DIAGNOSIS — M25.561 CHRONIC PAIN OF BOTH KNEES: ICD-10-CM

## 2021-03-09 PROCEDURE — 99442 PR PHYS/QHP TELEPHONE EVALUATION 11-20 MIN: CPT | Performed by: NURSE PRACTITIONER

## 2021-03-09 RX ORDER — GABAPENTIN 100 MG/1
100 CAPSULE ORAL EVERY 8 HOURS SCHEDULED
Qty: 90 CAPSULE | Refills: 2 | Status: SHIPPED | OUTPATIENT
Start: 2021-03-09 | End: 2021-04-26 | Stop reason: DRUGHIGH

## 2021-03-09 RX ORDER — GUAIFENESIN AND DEXTROMETHORPHAN HYDROBROMIDE 100; 10 MG/5ML; MG/5ML
5-10 SOLUTION ORAL EVERY 6 HOURS PRN
Qty: 60 ML | Refills: 0 | Status: SHIPPED | OUTPATIENT
Start: 2021-03-09 | End: 2021-04-26

## 2021-03-09 RX ORDER — PROMETHAZINE HYDROCHLORIDE 6.25 MG/5ML
6.25-12.5 SYRUP ORAL EVERY 6 HOURS PRN
Qty: 60 ML | Refills: 0 | Status: SHIPPED | OUTPATIENT
Start: 2021-03-09 | End: 2021-04-26

## 2021-03-09 RX ORDER — AZITHROMYCIN 250 MG/1
TABLET, FILM COATED ORAL
Qty: 6 TABLET | Refills: 0 | Status: SHIPPED | OUTPATIENT
Start: 2021-03-09 | End: 2021-04-26

## 2021-03-09 RX ORDER — QUETIAPINE FUMARATE 50 MG/1
50-100 TABLET, FILM COATED ORAL NIGHTLY
Qty: 60 TABLET | Refills: 1 | Status: SHIPPED | OUTPATIENT
Start: 2021-03-09 | End: 2021-04-26 | Stop reason: SDUPTHER

## 2021-03-09 NOTE — PROGRESS NOTES
You have chosen to receive care through a telephone visit today. Do you consent to use a telephone visit for your medical care today? Yes    Establish patient makes contact with office of [facility] to discuss health conditions.  Patient is due for routine checkup but due to current pandemic is not recommended to present to the office for face-to-face evaluation.      Chief Complaint   Patient presents with   • Cough       Brief HPI/ROS obtained as follows:    Cough-reports congestion and cough.  Cough is not productive.  Rhinorrhea is congestion.  No HA or dizziness.  He reports no SOA.  No ear pain .  Minimal sore throat.    Insomnia-reports not sleeping.  He is presently taking Pamelor 50 mg.  He reports he is awakening frequently.  He reports has been occurring for last few weeks.   He would like a trial of something else.  He reports he has taken Seroquel before and would like to try it again.    Back and knee pain-would like to have a new referral for pain management.  He is out of Gabapentin.  He was in clinic until covid and the clinic closed.  He has also been to PT but it was stopped during COVID.  He would like a referral to [facility].       The following portions of the patient's history were reviewed and updated as appropriate: CC, ROS, allergies, current medications, past family history, past medical history, past social history, past surgical history and problem list.    Review of Systems   Constitutional: Negative for activity change, appetite change, fatigue and fever.   HENT: Positive for rhinorrhea. Negative for congestion, ear pain, facial swelling, nosebleeds, sinus pressure, sore throat and trouble swallowing.    Eyes: Negative for blurred vision, double vision and redness.   Respiratory: Positive for cough. Negative for chest tightness, shortness of breath and wheezing.    Cardiovascular: Negative for chest pain, palpitations and leg swelling.   Gastrointestinal: Negative for abdominal  "pain, blood in stool, constipation and diarrhea.   Endocrine: Negative.    Genitourinary: Negative for dysuria, flank pain, frequency, hematuria and urgency.   Musculoskeletal: Negative.    Skin: Negative.    Allergic/Immunologic: Negative.    Neurological: Negative for dizziness, weakness, light-headedness and headache.   Hematological: Negative.    Psychiatric/Behavioral: Negative for self-injury, sleep disturbance and suicidal ideas.   All other systems reviewed and are negative.      Assessment     Ht 170.2 cm (67.01\")   Wt 103 kg (228 lb)   BMI 35.70 kg/m²     Physical Exam     Patient alert and oriented.  Able to follow conversation without sounding breathless.  No obvious distress.  Thought processes congruent with conversation.  Answers and ask questions without difficulty.      Assessment     Problem List Items Addressed This Visit        Mental Health    Insomnia secondary to depression with anxiety - Primary    Relevant Medications    QUEtiapine (SEROquel) 50 MG tablet       Musculoskeletal and Injuries    Chronic pain of both knees    Relevant Medications    tiZANidine (ZANAFLEX) 4 MG tablet    nabumetone (RELAFEN) 500 MG tablet    gabapentin (NEURONTIN) 100 MG capsule    Other Relevant Orders    Ambulatory Referral to Physical Therapy Evaluate and treat (Completed)    Ambulatory Referral to Pain Management (Completed)      Other Visit Diagnoses     Acute URI        Relevant Medications    azithromycin (Zithromax) 250 MG tablet    albuterol (PROVENTIL,VENTOLIN) 2 MG/5ML syrup    promethazine (PHENERGAN) 6.25 MG/5ML syrup    dextromethorphan-guaifenesin (Tussin DM)  MG/5ML syrup          Patient instructed and advised to call if symptoms are increasing or new symptoms occur.    Understands reasons for urgent and emergent care.  Patient (& family) verbalized agreement for treatment plan.   Generalized precautions advised including social distancing, hand washing, cough/sneeze hygiene.     "     Instructed to complete all of antibiotics for acute illness.  Increase PO fluids, avoid/limit caffeine.  Do not save any of the meds for later use.  Rest PRN    LAURI reviewed today and consistent.  Will refill prescribed controlled medication today.  Patient is aware they cannot receive narcotics from any other provider except if under care of pain management or speciality clinic.  Risk and benefits of medication use has been reviewed.  History and physical exam exhibit continued safe and appropriate use of controlled substances.  The patient is aware of the potential for addiction and dependence.  This patient has been made aware of the appropriate use of such medications, including potential risk of somnolence, limited ability to drive and / or work safely, and potential for overdose.    It has also been made clear that these medications are for use by this patient only, without concomitant use of alcohol or other substances unless prescribed/advised by medical provider.  Patient understands they may be subject to UDS and pill counts at random.      Patient considered to be low risk for addiction due to use of single controlled medications.  Patient understands and accepts these risks.  Patient need for medication will be reassessed at each visit.  Doses will be adjusted according to patient need and findings.    Goal of TX: Patient will not have any adverse reactions of medication.  Patient will have reduction in neuropathic symptoms of pain with use of gabapentin as directed.  Patient will not have any drug drug interactions.    Medication Dispense Information    Gabapentin   Dispensed: 1/11/2021 12:00 AM   Written:  11/2/2020   Unit strength: 100MG   Days supply: 30   Dispense Note: GivenName=RAULITOYSurName=LOOPBirthDate=[id number]=[address] [place], [place], [address]   Quantity: 90 each   Pharmacy: [facility]   Authorizing provider: ENID LYLE   Received from: [id number]  PDMP (Fill History)   Brand or Generic:       Updated referral made for Pain management.     RTC 1 month, sooner if needed.       This visit has been rescheduled as a phone visit to comply with patient safety concerns in accordance with CDC recommendations. Total time of discussion was 14 minutes.        This document has been electronically signed by:  ARTUR Brothers FNP-C Dragon disclaimer:  Much of this encounter note is an electronic transcription/translation of spoken language to printed text. The electronic translation of spoken language may permit erroneous, or at times, nonsensical words or phrases to be inadvertently transcribed; Although I have reviewed the note for such errors, some may still exist.

## 2021-03-16 ENCOUNTER — BULK ORDERING (OUTPATIENT)
Dept: CASE MANAGEMENT | Facility: OTHER | Age: 36
End: 2021-03-16

## 2021-03-16 DIAGNOSIS — Z23 IMMUNIZATION DUE: ICD-10-CM

## 2021-04-26 ENCOUNTER — OFFICE VISIT (OUTPATIENT)
Dept: FAMILY MEDICINE CLINIC | Facility: CLINIC | Age: 36
End: 2021-04-26

## 2021-04-26 VITALS
BODY MASS INDEX: 38.61 KG/M2 | TEMPERATURE: 96.9 F | WEIGHT: 246 LBS | DIASTOLIC BLOOD PRESSURE: 96 MMHG | HEIGHT: 67 IN | OXYGEN SATURATION: 98 % | SYSTOLIC BLOOD PRESSURE: 142 MMHG | HEART RATE: 104 BPM

## 2021-04-26 DIAGNOSIS — M25.561 CHRONIC PAIN OF BOTH KNEES: Primary | ICD-10-CM

## 2021-04-26 DIAGNOSIS — E66.09 CLASS 2 OBESITY DUE TO EXCESS CALORIES WITHOUT SERIOUS COMORBIDITY WITH BODY MASS INDEX (BMI) OF 38.0 TO 38.9 IN ADULT: ICD-10-CM

## 2021-04-26 DIAGNOSIS — F51.05 INSOMNIA SECONDARY TO DEPRESSION WITH ANXIETY: ICD-10-CM

## 2021-04-26 DIAGNOSIS — F41.8 INSOMNIA SECONDARY TO DEPRESSION WITH ANXIETY: ICD-10-CM

## 2021-04-26 DIAGNOSIS — G89.29 CHRONIC PAIN OF BOTH KNEES: Primary | ICD-10-CM

## 2021-04-26 DIAGNOSIS — M25.562 CHRONIC PAIN OF BOTH KNEES: Primary | ICD-10-CM

## 2021-04-26 PROCEDURE — 99214 OFFICE O/P EST MOD 30 MIN: CPT | Performed by: NURSE PRACTITIONER

## 2021-04-26 RX ORDER — QUETIAPINE FUMARATE 50 MG/1
150 TABLET, FILM COATED ORAL NIGHTLY
Qty: 90 TABLET | Refills: 2 | Status: SHIPPED | OUTPATIENT
Start: 2021-04-26 | End: 2021-08-16

## 2021-04-26 RX ORDER — GABAPENTIN 300 MG/1
300 CAPSULE ORAL 3 TIMES DAILY
Qty: 60 CAPSULE | Refills: 2 | Status: SHIPPED | OUTPATIENT
Start: 2021-04-26 | End: 2021-08-16

## 2021-04-26 RX ORDER — NORTRIPTYLINE HYDROCHLORIDE 25 MG/1
CAPSULE ORAL
COMMUNITY
Start: 2021-04-20 | End: 2021-04-26

## 2021-04-26 NOTE — PROGRESS NOTES
Cristine Mar is a 35 y.o. male.     Chief Complaint   Patient presents with   • Insomnia       History of Present Illness     Insomnia-chronic and ongoing.  Patient is currently taking quetiapine 50 mg.  He is presently taking 3 tabs at night and is sleeping great.  No concerns.    Hyperlipidemia-chronic and ongoing.  Patient is currently ordered atorvastatin 20 mg.  No negative side effects.  Patient denies any negative side effects of cholesterol medication.  No reported myalgia or myopathies.  Knee pain-chronic and ongoing.  Patient has been under the care of pain management but office visits were stopped at his clinic due to Covid.  He is currently taking gabapentin 100 mg.  He has obtained knee braces and feels they are doing good.  He has been doing PT.  He has 2 visits left.   He is taking Gabapentin 100 mg TID.  He reports it is not helping as much as previous.    Obesity-patient has been on Adipex in the past.  Noted weight gain of 10 pounds since December.  He has not refilled Adipex since that time.    Routine Health-he would like to obtain his covid vaccine.      The following portions of the patient's history were reviewed and updated as appropriate: CC, ROS, allergies, current medications, past family history, past medical history, past social history, past surgical history and problem list.    Review of Systems   Constitutional: Negative for appetite change, fatigue, unexpected weight gain and unexpected weight loss.   HENT: Negative for congestion, ear pain, postnasal drip, rhinorrhea, sore throat, swollen glands, trouble swallowing and voice change.    Eyes: Negative for pain and visual disturbance.   Respiratory: Negative for cough, chest tightness, shortness of breath and wheezing.    Cardiovascular: Negative for chest pain, palpitations and leg swelling.   Gastrointestinal: Negative for abdominal pain, blood in stool, constipation, diarrhea, nausea and indigestion.   Genitourinary:  "Negative for dysuria, hematuria and urgency.   Musculoskeletal: Positive for arthralgias. Negative for back pain, gait problem, joint swelling and myalgias.   Skin: Negative for color change and skin lesions.   Allergic/Immunologic: Negative.    Neurological: Negative for numbness, headache and confusion.   Hematological: Negative.    Psychiatric/Behavioral: Positive for sleep disturbance. Negative for dysphoric mood and suicidal ideas. The patient is not nervous/anxious.    All other systems reviewed and are negative.      Objective     /96   Pulse 104   Temp 96.9 °F (36.1 °C) (Temporal)   Ht 170.2 cm (67\")   Wt 112 kg (246 lb)   SpO2 98%   BMI 38.53 kg/m²     Physical Exam  Vitals reviewed.   Constitutional:       General: He is not in acute distress.     Appearance: He is well-developed. He is not diaphoretic.   HENT:      Head: Normocephalic and atraumatic.      Comments: Oropharynx not examined.  Patient is presently wearing a face covering/mask due to COVID-19 pandemic.     Right Ear: Hearing, tympanic membrane, ear canal and external ear normal.      Left Ear: Hearing, tympanic membrane, ear canal and external ear normal.   Eyes:      General: Lids are normal. No scleral icterus.     Extraocular Movements:      Right eye: Normal extraocular motion and no nystagmus.      Left eye: Normal extraocular motion and no nystagmus.      Conjunctiva/sclera: Conjunctivae normal.      Pupils: Pupils are equal, round, and reactive to light.   Neck:      Thyroid: No thyromegaly.      Vascular: No carotid bruit or JVD.      Trachea: No tracheal tenderness.   Cardiovascular:      Rate and Rhythm: Normal rate and regular rhythm.      Heart sounds: Normal heart sounds, S1 normal and S2 normal. No murmur heard.     Pulmonary:      Effort: Pulmonary effort is normal.      Breath sounds: Normal breath sounds.   Chest:      Chest wall: No tenderness.   Abdominal:      General: Bowel sounds are normal.      Palpations: " Abdomen is soft. There is no mass.      Tenderness: There is no abdominal tenderness.   Musculoskeletal:         General: No tenderness.      Cervical back: Normal range of motion and neck supple.      Right knee: Bony tenderness present. Decreased range of motion.      Left knee: Bony tenderness present. Decreased range of motion.      Right lower leg: No edema.      Left lower leg: No edema.      Comments: No muscular atrophy or flaccidity.   Lymphadenopathy:      Cervical: No cervical adenopathy.      Right cervical: No superficial cervical adenopathy.     Left cervical: No superficial cervical adenopathy.   Skin:     General: Skin is warm and dry.      Capillary Refill: Capillary refill takes less than 2 seconds.      Coloration: Skin is not pale.      Findings: No erythema.      Nails: There is no clubbing.   Neurological:      Mental Status: He is alert and oriented to person, place, and time.      Cranial Nerves: No cranial nerve deficit or facial asymmetry.      Sensory: No sensory deficit.      Motor: No tremor, atrophy or abnormal muscle tone.      Coordination: Coordination normal.      Deep Tendon Reflexes: Reflexes are normal and symmetric.   Psychiatric:         Attention and Perception: He is attentive.         Mood and Affect: Mood normal.         Speech: Speech normal.         Behavior: Behavior normal.         Thought Content: Thought content normal.         Judgment: Judgment normal.       Assessment/Plan     Problem List Items Addressed This Visit        Endocrine and Metabolic    Class 2 obesity due to excess calories without serious comorbidity with body mass index (BMI) of 38.0 to 38.9 in adult    Current Assessment & Plan     Encouraged to work on weight reduction.             Mental Health    Insomnia secondary to depression with anxiety    Relevant Medications    QUEtiapine (SEROquel) 50 MG tablet       Musculoskeletal and Injuries    Chronic pain of both knees - Primary    Relevant  Medications    tiZANidine (ZANAFLEX) 4 MG tablet    nabumetone (RELAFEN) 500 MG tablet    gabapentin (NEURONTIN) 300 MG capsule    Other Relevant Orders    Urine Drug Screen - Urine, Clean Catch          Patient's Body mass index is 38.53 kg/m². BMI is above normal parameters. Recommendations include: nutrition counseling.       Understands disease processes and need for medications.  Understands reasons for urgent and emergent care.  Patient (& family) verbalized agreement for treatment plan.   Emotional support and active listening provided.  Patient provided time to verbalize feelings.    LAURI reviewed today and consistent.  Will refill prescribed controlled medication today.  Patient is aware they cannot receive narcotics from any other provider except if under care of pain management or speciality clinic.  Risk and benefits of medication use has been reviewed.  History and physical exam exhibit continued safe and appropriate use of controlled substances.  The patient is aware of the potential for addiction and dependence.  This patient has been made aware of the appropriate use of such medications, including potential risk of somnolence, limited ability to drive and / or work safely, and potential for overdose.  Patient understands not to take medication and drive until they know how medication may affect their cognition/decision making.   It has also been made clear that these medications are for use by this patient only, without concomitant use of alcohol or other substances unless prescribed/advised by medical provider.  Patient understands they may be subject to UDS and pill counts at random.      Patient considered to be low risk for addiction due to use of single controlled medications.  Patient understands and accepts these risks.  Patient need for medication will be reassessed at each visit.  Doses will be adjusted according to patient need and findings.    Goal of TX: Patient will not have any  adverse reactions of medication.  Patient will have reduction in neuropathic symptoms of pain with use of gabapentin as directed.  Patient will not have any drug drug interactions.    Medication Dispense Information    Gabapentin   Dispensed: 4/20/2021 12:00 AM   Written:  3/9/2021   Unit strength: 100MG   Days supply: 30   Dispense Note: GivenName=Melissa=LOOPBirthDate=[id number]=[address] [place], [place], [address]   Quantity: 90 each   Pharmacy: [facility]   Authorizing provider: ENID LYLE   Received from: LAURI PDMP (Fill History)   Brand or Generic:       appt tomorrow for covid vaccine at 1230 pm    Will follow up on pain management.    Will plan on updated labs.      RTC 3 months, sooner if needed.             This document has been electronically signed by:  ARTUR Brothers, FNP-C    Dragon disclaimer:  Much of this encounter note is an electronic transcription/translation of spoken language to printed text. The electronic translation of spoken language may permit erroneous, or at times, nonsensical words or phrases to be inadvertently transcribed; Although I have reviewed the note for such errors, some may still exist.

## 2021-06-15 ENCOUNTER — OFFICE VISIT (OUTPATIENT)
Dept: FAMILY MEDICINE CLINIC | Facility: CLINIC | Age: 36
End: 2021-06-15

## 2021-06-15 DIAGNOSIS — E66.09 CLASS 2 OBESITY DUE TO EXCESS CALORIES WITHOUT SERIOUS COMORBIDITY WITH BODY MASS INDEX (BMI) OF 38.0 TO 38.9 IN ADULT: Primary | ICD-10-CM

## 2021-06-15 DIAGNOSIS — Z00.00 HEALTHCARE MAINTENANCE: ICD-10-CM

## 2021-06-15 DIAGNOSIS — E78.2 MIXED HYPERLIPIDEMIA: ICD-10-CM

## 2021-06-15 DIAGNOSIS — B07.9 VERRUCA VULGARIS: ICD-10-CM

## 2021-06-15 DIAGNOSIS — K52.9 GASTROENTERITIS: ICD-10-CM

## 2021-06-15 DIAGNOSIS — F41.8 INSOMNIA SECONDARY TO DEPRESSION WITH ANXIETY: ICD-10-CM

## 2021-06-15 DIAGNOSIS — F51.05 INSOMNIA SECONDARY TO DEPRESSION WITH ANXIETY: ICD-10-CM

## 2021-06-15 PROCEDURE — 99214 OFFICE O/P EST MOD 30 MIN: CPT | Performed by: GENERAL PRACTICE

## 2021-06-15 PROCEDURE — 17000 DESTRUCT PREMALG LESION: CPT | Performed by: GENERAL PRACTICE

## 2021-06-15 NOTE — PROGRESS NOTES
Cristine Mar is a 35 y.o. male.     History of Present Illness     Nausea and Vomiting  He presents with an approximate 18-hour history of nausea, vomiting, and diarrhea.  His symptoms have been improving today and his last episode of vomiting was 2 to 3 hours ago and his last episode of diarrhea 4 to 5 hours ago.  There is no history of any abdominal pain and he denies any hematemesis, hematochezia, melena, fever, or chills.  There is no clear history of any suspicious food and he denies any recent antibiotics, travel outside the area, or contact with anyone with similar symptoms.    Skin Lesion  He gives an approximate 1 year history of a lesion over his right distal forearm.  This appeared suddenly and has increased somewhat in size.  He states that it tends to get caught on things and is painful when this happens.  He denies any other associated symptoms and there is no history of any bleeding.    Dyslipidemia  He is taking atorvastatin as prescribed.  He is active but admits that he is not following any particular diet.  He has had no recent labs    The following portions of the patient's history were reviewed and updated as appropriate: allergies, current medications, past medical history, past social history and problem list.    Review of Systems   Constitutional: Positive for fatigue. Negative for appetite change, chills, fever and unexpected weight change.   HENT: Negative for congestion, ear pain, rhinorrhea, sneezing and sore throat.    Eyes: Negative for visual disturbance.   Respiratory: Negative for cough, shortness of breath and wheezing.    Cardiovascular: Negative for chest pain, palpitations and leg swelling.   Gastrointestinal: Positive for diarrhea, nausea and vomiting. Negative for abdominal pain, blood in stool and constipation.   Genitourinary: Negative for dysuria and hematuria.   Musculoskeletal: Negative for arthralgias, back pain and neck pain.   Skin: Negative for rash.         Skin lesion   Neurological: Negative for weakness, numbness and headaches.     Objective   Physical Exam  Constitutional:       General: He is not in acute distress.     Appearance: Normal appearance. He is well-developed. He is not ill-appearing or diaphoretic.      Comments: Bright and in good spirits. No apparent distress. No pallor, jaundice, diaphoresis, or cyanosis.   HENT:      Head: Atraumatic.      Right Ear: Tympanic membrane, ear canal and external ear normal.      Left Ear: Tympanic membrane, ear canal and external ear normal.   Eyes:      Conjunctiva/sclera: Conjunctivae normal.   Neck:      Thyroid: No thyroid mass or thyromegaly.      Vascular: No carotid bruit or JVD.      Trachea: Trachea normal. No tracheal deviation.   Cardiovascular:      Rate and Rhythm: Normal rate and regular rhythm.      Heart sounds: Normal heart sounds, S1 normal and S2 normal. No murmur heard.   No gallop. No S3 or S4 sounds.    Pulmonary:      Effort: Pulmonary effort is normal.      Breath sounds: Normal breath sounds.   Abdominal:      General: Bowel sounds are normal. There is no distension.      Palpations: Abdomen is soft. There is no hepatomegaly, splenomegaly or mass.      Tenderness: There is no abdominal tenderness.      Hernia: No hernia is present.   Musculoskeletal:      Right lower leg: No edema.      Left lower leg: No edema.   Lymphadenopathy:      Head:      Right side of head: No submental, submandibular, tonsillar, preauricular, posterior auricular or occipital adenopathy.      Left side of head: No submental, submandibular, tonsillar, preauricular, posterior auricular or occipital adenopathy.      Cervical: No cervical adenopathy.      Upper Body:      Right upper body: No supraclavicular adenopathy.      Left upper body: No supraclavicular adenopathy.   Skin:     General: Skin is warm and dry.      Coloration: Skin is not cyanotic, jaundiced or pale.      Findings: Lesion (1 cm oval well-circumscribed  verrucous papule right distal forearm) present. No rash.      Nails: There is no clubbing.   Neurological:      Mental Status: He is alert and oriented to person, place, and time.      Cranial Nerves: No cranial nerve deficit.      Motor: No tremor.      Coordination: Coordination normal.      Gait: Gait normal.   Psychiatric:         Attention and Perception: Attention normal.         Mood and Affect: Mood normal.         Speech: Speech normal.         Behavior: Behavior normal.       Assessment/Plan   Problems Addressed this Visit        Cardiac and Vasculature    Mixed hyperlipidemia  Encouraged to continue to work on his diet and exercise plan.  Continue current medication  Scheduled for updated labs   Follow-up with ARTUR Reynolds on 7/20/2021    Relevant Orders    CBC & Differential    Comprehensive Metabolic Panel    Lipid Panel       Endocrine and Metabolic    Class 2 obesity due to excess calories without serious comorbidity with body mass index (BMI) of 38.0 to 38.9 in adult   As above.       Gastrointestinal Abdominal     Gastroenteritis  Improving  Instructed regarding a gradual titration of his diet  Work excuse written for the next 2 days  Encouraged to report if any worse, any new symptoms, or if not resolving entirely over the next day       Health Encounters    Healthcare maintenance  Encouraged to obtain a COVID-19 immunization.       Mental Health    Insomnia secondary to depression with anxiety       Skin    Verruca vulgaris  Symptomatic.  Right distal forearm  Reviewed options and agreed on a trial of cryotherapy  Encouraged to report if any worse or if any new symptoms or concerns.  Follow-up with ARTUR Reynolds at his appointment next month      Diagnoses       Codes Comments    Class 2 obesity due to excess calories without serious comorbidity with body mass index (BMI) of 38.0 to 38.9 in adult    -  Primary ICD-10-CM: E66.09, Z68.38  ICD-9-CM: 278.00, V85.38     Insomnia secondary to  depression with anxiety     ICD-10-CM: F51.05, F41.8  ICD-9-CM: 300.4, 327.02     Healthcare maintenance     ICD-10-CM: Z00.00  ICD-9-CM: V70.0     Verruca vulgaris     ICD-10-CM: B07.9  ICD-9-CM: 078.10     Mixed hyperlipidemia     ICD-10-CM: E78.2  ICD-9-CM: 272.2     Gastroenteritis     ICD-10-CM: K52.9  ICD-9-CM: 558.9

## 2021-06-15 NOTE — PROGRESS NOTES
Cryotherapy Procedure Note    Pre-operative Diagnosis: verruca vulgaris    Post-operative Diagnosis: same    Locations: right distal forearm     Indications: ablation for symptom relief    Anesthesia: not required     Procedure Details   Patient informed of risks (permanent scarring, infection, light or dark discoloration, bleeding, infection, weakness, numbness and recurrence of the lesion) and benefits of the procedure and verbal informed consent obtained.    The areas are treated with liquid nitrogen therapy, frozen until ice ball extended 2 mm beyond lesion, allowed to thaw, and treated again. The patient tolerated procedure well.  The patient was instructed on post-op care, warned that there may be blister formation, redness and pain. Recommend OTC analgesia as needed for pain.    Condition:  Stable    Complications:  none.    Plan:  1. Instructed to keep the area dry and covered for 24-48h and clean thereafter.  2. Warning signs of infection were reviewed.    3. Recommended that the patient use OTC acetaminophen as needed for pain.   4. Return in 1 month if lesion has not resolved entirely.

## 2021-06-16 ENCOUNTER — LAB (OUTPATIENT)
Dept: FAMILY MEDICINE CLINIC | Facility: CLINIC | Age: 36
End: 2021-06-16

## 2021-06-16 VITALS
HEIGHT: 67 IN | WEIGHT: 244 LBS | OXYGEN SATURATION: 97 % | TEMPERATURE: 97.1 F | HEART RATE: 96 BPM | DIASTOLIC BLOOD PRESSURE: 80 MMHG | SYSTOLIC BLOOD PRESSURE: 129 MMHG | BODY MASS INDEX: 38.3 KG/M2 | RESPIRATION RATE: 14 BRPM

## 2021-06-16 DIAGNOSIS — E78.2 MIXED HYPERLIPIDEMIA: ICD-10-CM

## 2021-06-16 PROBLEM — K52.9 GASTROENTERITIS: Status: ACTIVE | Noted: 2021-06-16

## 2021-06-16 PROBLEM — R10.84 GENERALIZED ABDOMINAL PAIN: Status: RESOLVED | Noted: 2020-02-25 | Resolved: 2021-06-16

## 2021-06-16 PROBLEM — E66.01 MORBIDLY OBESE (HCC): Status: RESOLVED | Noted: 2020-03-25 | Resolved: 2021-06-16

## 2021-06-16 PROCEDURE — 80053 COMPREHEN METABOLIC PANEL: CPT | Performed by: GENERAL PRACTICE

## 2021-06-16 PROCEDURE — 36415 COLL VENOUS BLD VENIPUNCTURE: CPT | Performed by: GENERAL PRACTICE

## 2021-06-16 PROCEDURE — 85025 COMPLETE CBC W/AUTO DIFF WBC: CPT | Performed by: GENERAL PRACTICE

## 2021-06-16 PROCEDURE — 80061 LIPID PANEL: CPT | Performed by: GENERAL PRACTICE

## 2021-06-17 LAB
ALBUMIN SERPL-MCNC: 4.2 G/DL (ref 3.5–5.2)
ALBUMIN/GLOB SERPL: 1.8 G/DL
ALP SERPL-CCNC: 81 U/L (ref 39–117)
ALT SERPL W P-5'-P-CCNC: 37 U/L (ref 1–41)
ANION GAP SERPL CALCULATED.3IONS-SCNC: 11.6 MMOL/L (ref 5–15)
AST SERPL-CCNC: 27 U/L (ref 1–40)
BASOPHILS # BLD AUTO: 0.02 10*3/MM3 (ref 0–0.2)
BASOPHILS NFR BLD AUTO: 0.3 % (ref 0–1.5)
BILIRUB SERPL-MCNC: 0.3 MG/DL (ref 0–1.2)
BUN SERPL-MCNC: 8 MG/DL (ref 6–20)
BUN/CREAT SERPL: 10.5 (ref 7–25)
CALCIUM SPEC-SCNC: 8.5 MG/DL (ref 8.6–10.5)
CHLORIDE SERPL-SCNC: 108 MMOL/L (ref 98–107)
CHOLEST SERPL-MCNC: 190 MG/DL (ref 0–200)
CO2 SERPL-SCNC: 22.4 MMOL/L (ref 22–29)
CREAT SERPL-MCNC: 0.76 MG/DL (ref 0.76–1.27)
DEPRECATED RDW RBC AUTO: 43.3 FL (ref 37–54)
EOSINOPHIL # BLD AUTO: 0.14 10*3/MM3 (ref 0–0.4)
EOSINOPHIL NFR BLD AUTO: 2.2 % (ref 0.3–6.2)
ERYTHROCYTE [DISTWIDTH] IN BLOOD BY AUTOMATED COUNT: 13.1 % (ref 12.3–15.4)
GFR SERPL CREATININE-BSD FRML MDRD: 117 ML/MIN/1.73
GLOBULIN UR ELPH-MCNC: 2.4 GM/DL
GLUCOSE SERPL-MCNC: 83 MG/DL (ref 65–99)
HCT VFR BLD AUTO: 46.2 % (ref 37.5–51)
HDLC SERPL-MCNC: 29 MG/DL (ref 40–60)
HGB BLD-MCNC: 15.9 G/DL (ref 13–17.7)
IMM GRANULOCYTES # BLD AUTO: 0.02 10*3/MM3 (ref 0–0.05)
IMM GRANULOCYTES NFR BLD AUTO: 0.3 % (ref 0–0.5)
LDLC SERPL CALC-MCNC: 107 MG/DL (ref 0–100)
LDLC/HDLC SERPL: 3.38 {RATIO}
LYMPHOCYTES # BLD AUTO: 1.91 10*3/MM3 (ref 0.7–3.1)
LYMPHOCYTES NFR BLD AUTO: 30.7 % (ref 19.6–45.3)
MCH RBC QN AUTO: 31.4 PG (ref 26.6–33)
MCHC RBC AUTO-ENTMCNC: 34.4 G/DL (ref 31.5–35.7)
MCV RBC AUTO: 91.1 FL (ref 79–97)
MONOCYTES # BLD AUTO: 0.41 10*3/MM3 (ref 0.1–0.9)
MONOCYTES NFR BLD AUTO: 6.6 % (ref 5–12)
NEUTROPHILS NFR BLD AUTO: 3.73 10*3/MM3 (ref 1.7–7)
NEUTROPHILS NFR BLD AUTO: 59.9 % (ref 42.7–76)
NRBC BLD AUTO-RTO: 0 /100 WBC (ref 0–0.2)
PLATELET # BLD AUTO: 179 10*3/MM3 (ref 140–450)
PMV BLD AUTO: 11.8 FL (ref 6–12)
POTASSIUM SERPL-SCNC: 4.1 MMOL/L (ref 3.5–5.2)
PROT SERPL-MCNC: 6.6 G/DL (ref 6–8.5)
RBC # BLD AUTO: 5.07 10*6/MM3 (ref 4.14–5.8)
SODIUM SERPL-SCNC: 142 MMOL/L (ref 136–145)
TRIGL SERPL-MCNC: 315 MG/DL (ref 0–150)
VLDLC SERPL-MCNC: 54 MG/DL (ref 5–40)
WBC # BLD AUTO: 6.23 10*3/MM3 (ref 3.4–10.8)

## 2021-07-20 ENCOUNTER — OFFICE VISIT (OUTPATIENT)
Dept: FAMILY MEDICINE CLINIC | Facility: CLINIC | Age: 36
End: 2021-07-20

## 2021-07-20 VITALS
BODY MASS INDEX: 38.14 KG/M2 | SYSTOLIC BLOOD PRESSURE: 124 MMHG | WEIGHT: 243 LBS | OXYGEN SATURATION: 98 % | HEIGHT: 67 IN | TEMPERATURE: 97.4 F | DIASTOLIC BLOOD PRESSURE: 86 MMHG | HEART RATE: 106 BPM

## 2021-07-20 DIAGNOSIS — Z00.00 VISIT FOR ANNUAL HEALTH EXAMINATION: Primary | ICD-10-CM

## 2021-07-20 DIAGNOSIS — B07.9 VERRUCA VULGARIS: ICD-10-CM

## 2021-07-20 DIAGNOSIS — E66.09 CLASS 2 OBESITY DUE TO EXCESS CALORIES WITHOUT SERIOUS COMORBIDITY WITH BODY MASS INDEX (BMI) OF 38.0 TO 38.9 IN ADULT: ICD-10-CM

## 2021-07-20 DIAGNOSIS — G89.29 CHRONIC PAIN OF BOTH KNEES: ICD-10-CM

## 2021-07-20 DIAGNOSIS — F51.05 INSOMNIA SECONDARY TO DEPRESSION WITH ANXIETY: ICD-10-CM

## 2021-07-20 DIAGNOSIS — M25.562 CHRONIC PAIN OF BOTH KNEES: ICD-10-CM

## 2021-07-20 DIAGNOSIS — E78.2 MIXED HYPERLIPIDEMIA: ICD-10-CM

## 2021-07-20 DIAGNOSIS — F41.8 INSOMNIA SECONDARY TO DEPRESSION WITH ANXIETY: ICD-10-CM

## 2021-07-20 DIAGNOSIS — M25.561 CHRONIC PAIN OF BOTH KNEES: ICD-10-CM

## 2021-07-20 PROCEDURE — 99395 PREV VISIT EST AGE 18-39: CPT | Performed by: NURSE PRACTITIONER

## 2021-07-20 PROCEDURE — 17110 DESTRUCTION B9 LES UP TO 14: CPT | Performed by: NURSE PRACTITIONER

## 2021-07-20 NOTE — ASSESSMENT & PLAN NOTE
Continue Relafen 500 mg twice daily, tizanidine 4 mg as needed twice daily, and gabapentin 300 mg 3 times daily.  Reminded to use proper body mechanics for twisting and lifting.

## 2021-07-20 NOTE — ASSESSMENT & PLAN NOTE
Continue to work on weight reduction.  Continue Lipitor 20 mg.  Encouraged to pursue a low-cholesterol diet

## 2021-07-20 NOTE — PATIENT INSTRUCTIONS
It is important for your health to eat a healthy balanced diet, to exercise as tolerated, obtain dental and vision checkups routinely, work on stress reduction and be active about taking care of your mental health.  Routine age related immunizations(pneumonia, flu, shingles if applicable) are recommended.  Be active in obtaining age and gender routine maintenance exams (such as paps, breast exams, colonscopy, prostate exams, etc).    You are encouraged to have safe sex practices for STD prevention.    Be alert/educated on gun and water safety, seek help for any domestic violence concerns, and seatbelt use is strongly encouraged.        Stress, Adult  Stress is a normal reaction to life events. Stress is what you feel when life demands more than you are used to, or more than you think you can handle. Some stress can be useful, such as studying for a test or meeting a deadline at work. Stress that occurs too often or for too long can cause problems. It can affect your emotional health and interfere with relationships and normal daily activities. Too much stress can weaken your body's defense system (immune system) and increase your risk for physical illness. If you already have a medical problem, stress can make it worse.  What are the causes?  All sorts of life events can cause stress. An event that causes stress for one person may not be stressful for another person. Major life events, whether positive or negative, commonly cause stress. Examples include:  · Losing a job or starting a new job.  · Losing a loved one.  · Moving to a new town or home.  · Getting  or .  · Having a baby.  · Getting injured or sick.  Less obvious life events can also cause stress, especially if they occur day after day or in combination with each other. Examples include:  · Working long hours.  · Driving in traffic.  · Caring for children.  · Being in debt.  · Being in a difficult relationship.  What are the signs or  symptoms?  Stress can cause emotional symptoms, including:  · Anxiety. This is feeling worried, afraid, on edge, overwhelmed, or out of control.  · Anger, including irritation or impatience.  · Depression. This is feeling sad, down, helpless, or guilty.  · Trouble focusing, remembering, or making decisions.  Stress can cause physical symptoms, including:  · Aches and pains. These may affect your head, neck, back, stomach, or other areas of your body.  · Tight muscles or a clenched jaw.  · Low energy.  · Trouble sleeping.  Stress can cause unhealthy behaviors, including:  · Eating to feel better (overeating) or skipping meals.  · Working too much or putting off tasks.  · Smoking, drinking alcohol, or using drugs to feel better.  How is this diagnosed?  Stress is diagnosed through an assessment by your health care provider. He or she may diagnose this condition based on:  · Your symptoms and any stressful life events.  · Your medical history.  · Tests to rule out other causes of your symptoms.  Depending on your condition, your health care provider may refer you to a specialist for further evaluation.  How is this treated?    Stress management techniques are the recommended treatment for stress. Medicine is not typically recommended for the treatment of stress.  Techniques to reduce your reaction to stressful life events include:  · Stress identification. Monitor yourself for symptoms of stress and identify what causes stress for you. These skills may help you to avoid or prepare for stressful events.  · Time management. Set your priorities, keep a calendar of events, and learn to say no. Taking these actions can help you avoid making too many commitments.  Techniques for coping with stress include:  · Rethinking the problem. Try to think realistically about stressful events rather than ignoring them or overreacting. Try to find the positives in a stressful situation rather than focusing on the negatives.  · Exercise.  Physical exercise can release both physical and emotional tension. The key is to find a form of exercise that you enjoy and do it regularly.  · Relaxation techniques. These relax the body and mind. The key is to find one or more that you enjoy and use the techniques regularly. Examples include:  ? Meditation, deep breathing, or progressive relaxation techniques.  ? Yoga or polo chi.  ? Biofeedback, mindfulness techniques, or journaling.  ? Listening to music, being out in nature, or participating in other hobbies.  · Practicing a healthy lifestyle. Eat a balanced diet, drink plenty of water, limit or avoid caffeine, and get plenty of sleep.  · Having a strong support network. Spend time with family, friends, or other people you enjoy being around. Express your feelings and talk things over with someone you trust.  Counseling or talk therapy with a mental health professional may be helpful if you are having trouble managing stress on your own.  Follow these instructions at home:  Lifestyle    · Avoid drugs.  · Do not use any products that contain nicotine or tobacco, such as cigarettes, e-cigarettes, and chewing tobacco. If you need help quitting, ask your health care provider.  · Limit alcohol intake to no more than 1 drink a day for nonpregnant women and 2 drinks a day for men. One drink equals 12 oz of beer, 5 oz of wine, or 1½ oz of hard liquor  · Do not use alcohol or drugs to relax.  · Eat a balanced diet that includes fresh fruits and vegetables, whole grains, lean meats, fish, eggs, and beans, and low-fat dairy. Avoid processed foods and foods high in added fat, sugar, and salt.  · Exercise at least 30 minutes on 5 or more days each week.  · Get 7-8 hours of sleep each night.  General instructions    · Practice stress management techniques as discussed with your health care provider.  · Drink enough fluid to keep your urine clear or pale yellow.  · Take over-the-counter and prescription medicines only as told  by your health care provider.  · Keep all follow-up visits as told by your health care provider. This is important.  Contact a health care provider if:  · Your symptoms get worse.  · You have new symptoms.  · You feel overwhelmed by your problems and can no longer manage them on your own.  Get help right away if:  · You have thoughts of hurting yourself or others.  If you ever feel like you may hurt yourself or others, or have thoughts about taking your own life, get help right away. You can go to your nearest emergency department or call:  · Your local emergency services (911 in the [place]  · A suicide crisis helpline, such as the National Suicide Prevention Lifeline at 1-818.822.6515. This is open 24 hours a day.  Summary  · Stress is a normal reaction to life events. It can cause problems if it happens too often or for too long.  · Practicing stress management techniques is the best way to treat stress.  · Counseling or talk therapy with a mental health professional may be helpful if you are having trouble managing stress on your own.  This information is not intended to replace advice given to you by your health care provider. Make sure you discuss any questions you have with your health care provider.  Document Revised: 07/17/2020 Document Reviewed: 02/07/2018  [employer]vier Patient Education © 2021 [employer]    Budget-Friendly Healthy Eating  There are many ways to save money at the grocery store and continue to eat healthy. You can be successful if you:  · Plan meals according to your budget.  · Make a grocery list and only purchase food according to your grocery list.  · Prepare food yourself.  What are tips for following this plan?    Reading food labels  · Compare food labels between brand name foods and the store brand. Often the nutritional value is the same, but the store brand is lower cost.  · Look for products that do not have added sugar, fat, or salt (sodium). These often cost the same but are  "healthier for you. Products may be labeled as:  ? Sugar-free.  ? Nonfat.  ? Low-fat.  ? Sodium-free.  ? Low-sodium.  · Look for lean ground beef labeled as at least 92% lean and 8% fat.  Shopping  · Buy only the items on your grocery list and go only to the areas of the store that have the items on your list.  · Use coupons only for foods and brands you normally buy. Avoid buying items you wouldn't normally buy simply because they are on sale.  · Check online and in newspapers for weekly deals.  · Buy healthy items from the bulk bins when available, such as herbs, spices, flour, pasta, nuts, and dried fruit.  · Buy fruits and vegetables that are in season. Prices are usually lower on in-season produce.  · Look at the unit price on the price tag. Use it to compare different brands and sizes to find out which item is the best deal.  · Choose healthy items that are often low-cost, such as carrots, potatoes, apples, bananas, and oranges. Dried or canned beans are a low-cost protein source.  · Buy in bulk and freeze extra food. Items you can buy in bulk include meats, fish, poultry, frozen fruits, and frozen vegetables.  · Avoid buying \"ready-to-eat\" foods, such as pre-cut fruits and vegetables and pre-made salads.  · If possible, shop around to discover where you can find the best prices. Consider other retailers such as dollar stores, larger wholesale stores, local fruit and vegetable stands, and farmers markets.  · Do not shop when you are hungry. If you shop while hungry, it may be hard to stick to your list and budget.  · Resist impulse buying. Use your grocery list as your official plan for the week.  · Buy a variety of vegetables and fruits by purchasing fresh, frozen, and canned items.  · Look at the top and bottom shelves for deals. Foods at eye level (eye level of an adult or child) are usually more expensive.  · Be efficient with your time when shopping. The more time you spend at the store, the more money you " "are likely to spend.  · To save money when choosing more expensive foods like meats and dairy:  ? Choose cheaper cuts of meat, such as bone-in chicken thighs and drumsticks instead of skinless and boneless chicken. When you are ready to prepare the chicken, you can remove the skin yourself to make it healthier.  ? Choose lean meats like chicken or turkey instead of beef.  ? Choose canned seafood, such as tuna, salmon, or sardines.  ? Buy eggs as a low-cost source of protein.  ? Buy dried beans and peas, such as lentils, split peas, or kidney beans instead of meats. Dried beans and peas are a good alternative source of protein.  ? Buy the larger tubs of yogurt instead of individual-sized containers.  · Choose water instead of sodas and other sweetened beverages.  · Avoid buying chips, cookies, and other \"junk food.\" These items are usually expensive and not healthy.  Cooking  · Make extra food and freeze the extras in meal-sized containers or in individual portions for fast meals and snacks.  · Pre-cook on days when you have extra time to prepare meals in advance. You can keep these meals in the fridge or freezer and reheat for a quick meal.  · When you come home from the grocery store, wash, peel, and cut fruits and vegetables so they are ready to use and eat. This will help reduce food waste.  Meal planning  · Do not eat out or get fast food. Prepare food at home.  · Make a grocery list and make sure to bring it with you to the store. If you have a smart phone, you could use your phone to create your shopping list.  · Plan meals and snacks according to a grocery list and budget you create.  · Use leftovers in your meal plan for the week.  · Look for recipes where you can cook once and make enough food for two meals.  · Include budget-friendly meals like stews, casseroles, and stir-ny dishes.  · Try some meatless meals or try \"no cook\" meals like salads.  · Make sure that half your plate is filled with fruits or " "vegetables. Choose from fresh, frozen, or canned fruits and vegetables. If eating canned, remember to rinse them before eating. This will remove any excess salt added for packaging.  Summary  · Eating healthy on a budget is possible if you plan your meals according to your budget, purchase according to your budget and grocery list, and prepare food yourself.  · Tips for buying more food on a limited budget include buying generic brands, using coupons only for foods you normally buy, and buying healthy items from the bulk bins when available.  · Tips for buying cheaper food to replace expensive food include choosing cheaper, lean cuts of meat, and buying dried beans and peas.  This information is not intended to replace advice given to you by your health care provider. Make sure you discuss any questions you have with your health care provider.  Document Revised: 12/19/2018 Document Reviewed: 12/19/2018  [employer] Patient Education © 2021 [employer]    American Journal of Respiratory and Critical Care Medicine, 202(2), e5-e31. [email or web address]://[email or web address]/[email or web address]">   Health Risks of Smoking  Smoking tobacco is very bad for your health. Tobacco smoke contains many toxic chemicals that can damage every part of your body. Secondhand smoke can be harmful to those around you. Tobacco or nicotine use can cause many long-term (chronic) diseases.  Smoking is difficult to quit because a chemical in tobacco, called nicotine, causes addiction or dependence. When you smoke and inhale, nicotine is absorbed quickly into the bloodstream through your lungs. Both inhaled and non-inhaled nicotine may be addictive.  How can quitting affect me?  There are health benefits of quitting smoking. Some benefits happen right away and others take time. Benefits may include:  · Blood flow, blood pressure, heart rate, and lung capacity may begin to improve. However, any lung damage that has already occurred cannot be " repaired.  · Temporary respiratory symptoms, such as nasal congestion and cough, may improve over time.  · Your risk of heart disease, stroke, and cancer is reduced.  · The overall quality of your health may improve.  · You may save money, as you will not spend money on tobacco products and may spend less money on smoking-related health issues.  What can increase my risk?  Smoking harms nearly every organ in the body. People who smoke tobacco have a shorter life expectancy and an increased risk of many serious medical problems. These include:  · More respiratory infections, such as colds and pneumonia.  · Cancer.  · Heart disease.  · Stroke.  · Chronic respiratory diseases.  · Delayed wound healing and increased risk of complications during surgery.  · Problems with reproduction, pregnancy, and childbirth, such as infertility, early (premature) births, stillbirths, and birth defects.  Secondhand smoke exposure to children increases the risk of:  · Sudden infant death syndrome (SIDS).  · Infections in the nose, throat, or airways (respiratory infections).  · Chronic respiratory symptoms.  What actions can I take to quit?  Smoking is an addiction that affects both your body and your mind, and long-time habits can be hard to change. Your health care provider can recommend:  · Nicotine replacement products, such as patches, gum, and nasal sprays. Use these products only as directed. Do not replace cigarette smoking with electronic cigarettes, which are commonly called e-cigarettes. The safety of e-cigarettes is not known, and some may contain harmful chemicals.  · Programs and community resources, which may include group support, education, or talk therapy.  · Prescription medicines to help reduce cravings.  · A combination of two or more quit methods, which will increase the success of quitting.  Where to find support  Follow the recommendations from your health care provider about support groups and other assistance.  You can also visit:  · [place] [employer] Quitline Consortium: www.naquitline.org or call 5-574-QUIT-NOW.  · [place] Department of Health and Human Services: www.smokefree.gov  · American Lung Association: www.freedomfromsmoking.org  · American Heart Association: www.heart.org  Where to find more information  · Centers for Disease Control and Prevention: www.cdc.gov  · World Health Organization: www.who.int  Summary  · Smoking tobacco is very bad for your health. Tobacco smoke contains many toxic chemicals that can damage every part of the body.  · Smoking is difficult to quit because a chemical in tobacco, called nicotine, causes addiction or dependence.  · There are immediate and long-term health benefits of quitting smoking.  · A combination of two or more quit methods increases the success of quitting.  This information is not intended to replace advice given to you by your health care provider. Make sure you discuss any questions you have with your health care provider.  Document Revised: 02/01/2021 Document Reviewed: 02/01/2021  [employer] Patient Education © 2021 [employer]

## 2021-07-20 NOTE — PROGRESS NOTES
Cristine Mar is a 35 y.o. male.     Chief Complaint   Patient presents with   • Annual Exam       History of Present Illness     Physical-here for an annual physical.   Hyperlipidemia-chronic and ongoing.  Patient is currently taking Lipitor 20 mg.  No negative side effects.  Patient denies any negative side effects of cholesterol medication.  No reported myalgia or myopathies.  Insomnia/depression-ongoing.  Patient is currently taking Seroquel 50 mg.  He denies any depression concerns as well as any insomnia concerns.  He is tolerating medication well without any negative side effects.   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  He is currently taking Neurontin 300 mg 3 times daily.  This medication was initially started by pain management before Covid.  He reports that this medication does help with his knee pain.  He is also taking nabumetone 500 mg twice daily and tizanidine 4 mg as needed.   He is sore today from work as he does a lot of bending and lifting.    Wart on arm-continues to be present.  Reports it did get smaller with cryotherapy last month but did not completely resolve.  Continues to have some tenderness as it does get  Bumped.  No infection.  Occasional bleeding and inflammation.     The following portions of the patient's history were reviewed and updated as appropriate: CC, ROS, allergies, current medications, past family history, past medical history, past social history, past surgical history and problem list.      Review of Systems   Constitutional: Positive for fatigue. Negative for appetite change, unexpected weight gain and unexpected weight loss.   HENT: Negative for congestion, ear pain, postnasal drip, rhinorrhea, sore throat, swollen glands, trouble swallowing and voice change.    Eyes: Negative for pain and visual disturbance.   Respiratory: Negative for cough, chest tightness, shortness of breath and wheezing.   "  Cardiovascular: Negative for chest pain, palpitations and leg swelling.   Gastrointestinal: Negative for abdominal pain, blood in stool, constipation, diarrhea, nausea and indigestion.   Genitourinary: Negative for dysuria, hematuria and urgency.   Musculoskeletal: Positive for arthralgias, gait problem and myalgias. Negative for back pain and joint swelling.   Skin: Negative for color change and skin lesions.        Wart on right forearm below base of palm.  Approximately 3 mm.  Rough in texture.  Mild erythema to surrounding tissue   Allergic/Immunologic: Negative.    Neurological: Negative for numbness, headache and confusion.   Hematological: Negative.    Psychiatric/Behavioral: Negative for dysphoric mood, sleep disturbance, suicidal ideas and stress. The patient is not nervous/anxious.    All other systems reviewed and are negative.      Objective     /86   Pulse 106   Temp 97.4 °F (36.3 °C)   Ht 170.2 cm (67.01\")   Wt 110 kg (243 lb)   SpO2 98%   BMI 38.05 kg/m²     Physical Exam  Vitals reviewed.   Constitutional:       General: He is not in acute distress.     Appearance: He is well-developed. He is not diaphoretic.   HENT:      Head: Normocephalic and atraumatic.      Comments: Oropharynx not examined.  Patient is presently wearing a face covering/mask due to COVID-19 pandemic.     Right Ear: Hearing, tympanic membrane, ear canal and external ear normal.      Left Ear: Hearing, tympanic membrane, ear canal and external ear normal.   Eyes:      General: Lids are normal. No scleral icterus.     Extraocular Movements:      Right eye: Normal extraocular motion and no nystagmus.      Left eye: Normal extraocular motion and no nystagmus.      Conjunctiva/sclera: Conjunctivae normal.      Pupils: Pupils are equal, round, and reactive to light.   Neck:      Thyroid: No thyromegaly.      Vascular: No carotid bruit or JVD.      Trachea: No tracheal tenderness.   Cardiovascular:      Rate and Rhythm: " Normal rate and regular rhythm.      Heart sounds: Normal heart sounds, S1 normal and S2 normal. No murmur heard.     Pulmonary:      Effort: Pulmonary effort is normal.      Breath sounds: Normal breath sounds.   Chest:      Chest wall: No tenderness.   Abdominal:      General: Bowel sounds are normal.      Palpations: Abdomen is soft. There is no mass.      Tenderness: There is no abdominal tenderness.   Musculoskeletal:      Cervical back: Normal range of motion and neck supple.      Thoracic back: Tenderness present. No spasms.      Lumbar back: Tenderness and bony tenderness present. No spasms.      Right knee: Tenderness present over the medial joint line and lateral joint line.      Left knee: Tenderness present over the medial joint line and lateral joint line.      Right lower leg: No edema.      Left lower leg: No edema.      Comments: No muscular atrophy or flaccidity.   Lymphadenopathy:      Cervical: No cervical adenopathy.      Right cervical: No superficial cervical adenopathy.     Left cervical: No superficial cervical adenopathy.   Skin:     General: Skin is warm and dry.      Capillary Refill: Capillary refill takes less than 2 seconds.      Coloration: Skin is not pale.      Findings: No erythema.      Nails: There is no clubbing.             Comments: 3 to 4 mm skin lesion noted to left forearm.  Rough in texture.  Mild erythema noted around the base of the lesion.  Cryotherapy applied.  Patient tolerated well.   Neurological:      Mental Status: He is alert and oriented to person, place, and time.      Cranial Nerves: No cranial nerve deficit or facial asymmetry.      Sensory: No sensory deficit.      Motor: No tremor, atrophy or abnormal muscle tone.      Coordination: Coordination normal.      Gait: Gait normal.      Deep Tendon Reflexes: Reflexes are normal and symmetric.   Psychiatric:         Attention and Perception: He is attentive.         Mood and Affect: Mood normal.         Speech:  Speech normal.         Behavior: Behavior normal.         Thought Content: Thought content normal.         Judgment: Judgment normal.         Assessment/Plan      Visual Acuity Screening    Right eye Left eye Both eyes   Without correction: 20/70 20/70 20/70   With correction:          No flowsheet data found.      PHQ-9 Depression Screening  Little interest or pleasure in doing things?     Feeling down, depressed, or hopeless?     Trouble falling or staying asleep, or sleeping too much?     Feeling tired or having little energy?     Poor appetite or overeating?     Feeling bad about yourself - or that you are a failure or have let yourself or your family down?     Trouble concentrating on things, such as reading the newspaper or watching television?     Moving or speaking so slowly that other people could have noticed? Or the opposite - being so fidgety or restless that you have been moving around a lot more than usual?     Thoughts that you would be better off dead, or of hurting yourself in some way?     PHQ-9 Total Score     If you checked off any problems, how difficult have these problems made it for you to do your work, take care of things at home, or get along with other people?         Diagnoses and all orders for this visit:    1. Visit for annual health examination (Primary)    2. Mixed hyperlipidemia  Assessment & Plan:  Continue to work on weight reduction.  Continue Lipitor 20 mg.  Encouraged to pursue a low-cholesterol diet      3. Class 2 obesity due to excess calories without serious comorbidity with body mass index (BMI) of 38.0 to 38.9 in adult  Assessment & Plan:  Continue diet.  Continue to be active as able based on work schedule.      4. Insomnia secondary to depression with anxiety  Assessment & Plan:  Continue Seroquel 50 mg.  Report any negative side effects      5. Verruca vulgaris  Assessment & Plan:  Cryotherapy today to lesion.  Patient understands it may take several treatments for  complete resolution of the area.      6. Chronic pain of both knees  Assessment & Plan:  Continue Relafen 500 mg twice daily, tizanidine 4 mg as needed twice daily, and gabapentin 300 mg 3 times daily.  Reminded to use proper body mechanics for twisting and lifting.        Patient's Body mass index is 38.05 kg/m². indicating that he is morbidly obese (BMI > 40 or > 35 with obesity - related health condition). Obesity-related health conditions include the following: dyslipidemias. Obesity is unchanged. BMI is Is above average. We discussed portion control and increasing exercise..     León Mar  reports that he has been smoking cigarettes. He has a 8.00 pack-year smoking history. He has never used smokeless tobacco.. I have educated him on the risk of diseases from using tobacco products such as cancer, COPD and heart disease.     I advised him to quit and he is not willing to quit.    I spent Less than 3 minutes counseling the patient.    We will plan for updated labs.    Understands disease processes and need for medications.  Understands reasons for urgent and emergent care.  Patient (& family) verbalized agreement for treatment plan.   Emotional support and active listening provided.  Patient provided time to verbalize feelings.  Counseling provided today including importance of good nutrition, exercise as tolerated, dental health, stress reduction and mental health. Importance of immunizations discussed.   Appropriate screenings based on gender (paps, breast exam, mammogram, PSA, colon screens, etc).     Counseled on safe sex practices and STD prevention.   Counseling regarding gun and water safety, domestic violence, and seatbelt use.      Continue medications as directed.    RTC 3 to 4 months, sooner if needed for problems or concerns        This document has been electronically signed by:  ARTUR Brothers, FNP-C    Dragon disclaimer:  Much of this encounter note is an electronic  transcription/translation of spoken language to printed text. The electronic translation of spoken language may permit erroneous, or at times, nonsensical words or phrases to be inadvertently transcribed; Although I have reviewed the note for such errors, some may still exist.

## 2021-07-20 NOTE — ASSESSMENT & PLAN NOTE
Cryotherapy today to lesion.  Patient understands it may take several treatments for complete resolution of the area.

## 2021-07-21 ENCOUNTER — OFFICE VISIT (OUTPATIENT)
Dept: FAMILY MEDICINE CLINIC | Facility: CLINIC | Age: 36
End: 2021-07-21

## 2021-07-21 VITALS
TEMPERATURE: 97.2 F | HEIGHT: 67 IN | DIASTOLIC BLOOD PRESSURE: 84 MMHG | WEIGHT: 243 LBS | SYSTOLIC BLOOD PRESSURE: 126 MMHG | OXYGEN SATURATION: 97 % | BODY MASS INDEX: 38.14 KG/M2 | HEART RATE: 106 BPM

## 2021-07-21 DIAGNOSIS — B07.9 VERRUCA VULGARIS: Primary | ICD-10-CM

## 2021-07-21 PROCEDURE — 99212 OFFICE O/P EST SF 10 MIN: CPT | Performed by: NURSE PRACTITIONER

## 2021-07-21 PROCEDURE — 17250 CHEM CAUT OF GRANLTJ TISSUE: CPT | Performed by: NURSE PRACTITIONER

## 2021-07-21 RX ORDER — CEPHALEXIN 500 MG/1
500 CAPSULE ORAL 3 TIMES DAILY
Qty: 21 CAPSULE | Refills: 0 | Status: SHIPPED | OUTPATIENT
Start: 2021-07-21 | End: 2021-10-20

## 2021-07-21 NOTE — PROGRESS NOTES
Cristine Mar is a 35 y.o. male.     Chief Complaint   Patient presents with   • Verrucous Vulgaris       History of Present Illness     Wart on arm-received cryotherapy yesterday but reports he bumped his arm at work and tore the area open.  He reports that he cannot have any bandages on his arm due to a safety risk.  He is here today for provider to look at his arm and to determine what to do to stop any oozing or bleeding.  He has noted some minimal redness to the surrounding tissue of his arm.    The following portions of the patient's history were reviewed and updated as appropriate: CC, ROS, allergies, current medications, past family history, past medical history, past social history, past surgical history and problem list.      Review of Systems   Constitutional: Negative for activity change, appetite change and fever.   HENT: Negative for congestion, ear pain, facial swelling, nosebleeds, rhinorrhea, sinus pressure, sore throat and trouble swallowing.    Eyes: Negative for blurred vision, double vision and redness.   Respiratory: Negative for cough, chest tightness, shortness of breath and wheezing.    Cardiovascular: Negative for chest pain, palpitations and leg swelling.   Gastrointestinal: Negative for abdominal pain, blood in stool, constipation and diarrhea.   Endocrine: Negative.    Genitourinary: Negative for dysuria, flank pain, frequency, hematuria and urgency.   Musculoskeletal: Positive for arthralgias.   Skin:        Open oozing area on right arm previous spot the wart that received cryotherapy yesterday   Allergic/Immunologic: Negative.    Neurological: Negative for dizziness, weakness, light-headedness and headache.   Hematological: Negative.    Psychiatric/Behavioral: Negative for self-injury, sleep disturbance and suicidal ideas. The patient is not nervous/anxious.    All other systems reviewed and are negative.      Objective     /84   Pulse 106   Temp 97.2 °F (36.2 °C)    "Ht 170.2 cm (67.01\")   Wt 110 kg (243 lb)   SpO2 97%   BMI 38.05 kg/m²     Physical Exam  Assessment/Plan     Diagnoses and all orders for this visit:    1. Verruca vulgaris (Primary)  Assessment & Plan:  Cauterization of area with silver nitrate performed.  Patient advised not to pick at the area and to give silver nitrate time to dry before laying his arm down.  Report if any redness persist.  Begin Keflex    Orders:  -     cephalexin (KEFLEX) 500 MG capsule; Take 1 capsule by mouth 3 (Three) Times a Day.  Dispense: 21 capsule; Refill: 0    Understands disease processes and need for medications.  Understands reasons for urgent and emergent care.  Patient (& family) verbalized agreement for treatment plan.   Emotional support and active listening provided.  Patient provided time to verbalize feelings.    Instructed to complete all of antibiotics for acute illness.  Increase PO fluids, avoid/limit caffeine.  Do not save any of the meds for later use.  Rest PRN  RTC 3 to 5 days, sooner if needed for problems or concerns          This document has been electronically signed by:  ARTUR Brothers, FNP-C    Dragon disclaimer:  Much of this encounter note is an electronic transcription/translation of spoken language to printed text. The electronic translation of spoken language may permit erroneous, or at times, nonsensical words or phrases to be inadvertently transcribed; Although I have reviewed the note for such errors, some may still exist.      "

## 2021-07-21 NOTE — ASSESSMENT & PLAN NOTE
Cauterization of area with silver nitrate performed.  Patient advised not to pick at the area and to give silver nitrate time to dry before laying his arm down.  Report if any redness persist.  Begin Keflex

## 2021-08-16 DIAGNOSIS — G89.29 CHRONIC PAIN OF BOTH KNEES: ICD-10-CM

## 2021-08-16 DIAGNOSIS — F51.05 INSOMNIA SECONDARY TO DEPRESSION WITH ANXIETY: ICD-10-CM

## 2021-08-16 DIAGNOSIS — F41.8 INSOMNIA SECONDARY TO DEPRESSION WITH ANXIETY: ICD-10-CM

## 2021-08-16 DIAGNOSIS — M25.561 CHRONIC PAIN OF BOTH KNEES: ICD-10-CM

## 2021-08-16 DIAGNOSIS — M25.562 CHRONIC PAIN OF BOTH KNEES: ICD-10-CM

## 2021-08-16 RX ORDER — GABAPENTIN 300 MG/1
CAPSULE ORAL
Qty: 60 CAPSULE | Refills: 2 | Status: SHIPPED | OUTPATIENT
Start: 2021-08-16 | End: 2021-10-20 | Stop reason: SDUPTHER

## 2021-08-16 RX ORDER — QUETIAPINE FUMARATE 50 MG/1
150 TABLET, FILM COATED ORAL NIGHTLY
Qty: 90 TABLET | Refills: 2 | Status: SHIPPED | OUTPATIENT
Start: 2021-08-16 | End: 2021-10-20 | Stop reason: SDUPTHER

## 2021-10-11 DIAGNOSIS — R05.9 COUGH: ICD-10-CM

## 2021-10-11 DIAGNOSIS — J40 BRONCHITIS: ICD-10-CM

## 2021-10-11 RX ORDER — ALBUTEROL SULFATE 2.5 MG/3ML
SOLUTION RESPIRATORY (INHALATION)
Qty: 180 ML | Refills: 5 | Status: SHIPPED | OUTPATIENT
Start: 2021-10-11 | End: 2022-09-16

## 2021-10-20 ENCOUNTER — OFFICE VISIT (OUTPATIENT)
Dept: FAMILY MEDICINE CLINIC | Facility: CLINIC | Age: 36
End: 2021-10-20

## 2021-10-20 VITALS
OXYGEN SATURATION: 97 % | WEIGHT: 240.2 LBS | TEMPERATURE: 97.1 F | BODY MASS INDEX: 37.7 KG/M2 | HEIGHT: 67 IN | DIASTOLIC BLOOD PRESSURE: 86 MMHG | HEART RATE: 90 BPM | SYSTOLIC BLOOD PRESSURE: 126 MMHG

## 2021-10-20 DIAGNOSIS — B07.8 OTHER VIRAL WARTS: ICD-10-CM

## 2021-10-20 DIAGNOSIS — F51.05 INSOMNIA SECONDARY TO DEPRESSION WITH ANXIETY: ICD-10-CM

## 2021-10-20 DIAGNOSIS — G89.29 CHRONIC PAIN OF BOTH KNEES: ICD-10-CM

## 2021-10-20 DIAGNOSIS — F41.8 INSOMNIA SECONDARY TO DEPRESSION WITH ANXIETY: ICD-10-CM

## 2021-10-20 DIAGNOSIS — E78.2 MIXED HYPERLIPIDEMIA: Primary | ICD-10-CM

## 2021-10-20 DIAGNOSIS — M25.561 CHRONIC PAIN OF BOTH KNEES: ICD-10-CM

## 2021-10-20 DIAGNOSIS — M25.562 CHRONIC PAIN OF BOTH KNEES: ICD-10-CM

## 2021-10-20 PROCEDURE — 99214 OFFICE O/P EST MOD 30 MIN: CPT | Performed by: NURSE PRACTITIONER

## 2021-10-20 PROCEDURE — 17110 DESTRUCTION B9 LES UP TO 14: CPT | Performed by: NURSE PRACTITIONER

## 2021-10-20 RX ORDER — NABUMETONE 500 MG/1
500 TABLET, FILM COATED ORAL 2 TIMES DAILY
Qty: 60 TABLET | Refills: 5 | Status: SHIPPED | OUTPATIENT
Start: 2021-10-20 | End: 2021-11-09

## 2021-10-20 RX ORDER — TIZANIDINE 4 MG/1
4 TABLET ORAL 2 TIMES DAILY PRN
Qty: 60 TABLET | Refills: 5 | Status: SHIPPED | OUTPATIENT
Start: 2021-10-20 | End: 2022-09-16 | Stop reason: SDUPTHER

## 2021-10-20 RX ORDER — QUETIAPINE FUMARATE 50 MG/1
150 TABLET, FILM COATED ORAL NIGHTLY
Qty: 90 TABLET | Refills: 2 | Status: SHIPPED | OUTPATIENT
Start: 2021-10-20 | End: 2021-11-09

## 2021-10-20 RX ORDER — ATORVASTATIN CALCIUM 20 MG/1
20 TABLET, FILM COATED ORAL DAILY
Qty: 30 TABLET | Refills: 5 | Status: SHIPPED | OUTPATIENT
Start: 2021-10-20 | End: 2022-09-16 | Stop reason: SDUPTHER

## 2021-10-20 RX ORDER — GABAPENTIN 300 MG/1
300 CAPSULE ORAL 3 TIMES DAILY
Qty: 60 CAPSULE | Refills: 2 | Status: SHIPPED | OUTPATIENT
Start: 2021-10-20

## 2021-10-20 NOTE — PROGRESS NOTES
Cristine Mar is a 35 y.o. male.     Chief Complaint   Patient presents with   • Hyperlipidemia       History of Present Illness     Wart on left arm-reports has recently developed.  Reports is tender to touch.  It is in the middle edge of a cross tattoo on his left fore arm.  He does not want to have the area cut or frozen due to the potential disfiguration of his tattoo. He ask if it can be taken off in any other manner  Back and joint pain-chronic and ongoing.  Chronic knee pain.  He reports knees are more uncomfortable with work.  PT is not helpful and braces are not allowed at work.  He reports he is wearing good footwear on most days.  He is taking his gabapentin as directed.  No negative side effects.  He also has Nabumetone 500 mg BID and zanaflex 4 mg.    Routine health-plans to take his flu vaccine after his second covid vaccine.  He tolerated the first vaccine well.      The following portions of the patient's history were reviewed and updated as appropriate: CC, ROS, allergies, current medications, past family history, past medical history, past social history, past surgical history and problem list.      Review of Systems   Constitutional: Negative for activity change, appetite change and fever.   HENT: Negative for congestion, ear pain, facial swelling, nosebleeds, rhinorrhea, sinus pressure, sore throat and trouble swallowing.    Eyes: Negative for blurred vision, double vision and redness.   Respiratory: Negative for cough, chest tightness, shortness of breath and wheezing.    Cardiovascular: Negative for chest pain, palpitations and leg swelling.   Gastrointestinal: Negative for abdominal pain, blood in stool, constipation, diarrhea, vomiting and indigestion.   Endocrine: Negative.    Genitourinary: Negative for dysuria, flank pain, frequency, hematuria and urgency.   Musculoskeletal: Positive for arthralgias, back pain and gait problem.   Skin:        Wart     Allergic/Immunologic:  "Negative.    Neurological: Negative for dizziness, weakness, light-headedness and headache.   Hematological: Negative.    Psychiatric/Behavioral: Positive for stress. Negative for self-injury, sleep disturbance, suicidal ideas and depressed mood. The patient is not nervous/anxious.    All other systems reviewed and are negative.      Objective     /86   Pulse 90   Temp 97.1 °F (36.2 °C)   Ht 170.2 cm (67.01\")   Wt 109 kg (240 lb 3.2 oz)   SpO2 97%   BMI 37.61 kg/m²     Physical Exam  Vitals reviewed.   Constitutional:       General: He is not in acute distress.     Appearance: He is well-developed. He is not diaphoretic.   HENT:      Head: Normocephalic and atraumatic.      Comments: Oropharynx not examined.  Patient is presently wearing a face covering/mask due to COVID-19 pandemic.     Right Ear: Hearing, tympanic membrane, ear canal and external ear normal.      Left Ear: Hearing, tympanic membrane, ear canal and external ear normal.   Eyes:      General: Lids are normal. No scleral icterus.     Extraocular Movements:      Right eye: Normal extraocular motion and no nystagmus.      Left eye: Normal extraocular motion and no nystagmus.      Conjunctiva/sclera: Conjunctivae normal.      Pupils: Pupils are equal, round, and reactive to light.   Neck:      Thyroid: No thyromegaly.      Vascular: No carotid bruit or JVD.      Trachea: No tracheal tenderness.   Cardiovascular:      Rate and Rhythm: Normal rate and regular rhythm.      Heart sounds: Normal heart sounds, S1 normal and S2 normal. No murmur heard.      Pulmonary:      Effort: Pulmonary effort is normal.      Breath sounds: Normal breath sounds.   Chest:      Chest wall: No tenderness.   Abdominal:      General: Bowel sounds are normal.      Palpations: Abdomen is soft. There is no mass.      Tenderness: There is no abdominal tenderness.   Musculoskeletal:      Cervical back: Normal range of motion and neck supple.      Thoracic back: Tenderness " present.      Lumbar back: Tenderness present.      Right knee: Decreased range of motion. Tenderness present.      Left knee: Decreased range of motion. Tenderness present.      Right lower leg: No edema.      Left lower leg: No edema.      Comments: No muscular atrophy or flaccidity.   Lymphadenopathy:      Cervical: No cervical adenopathy.      Right cervical: No superficial cervical adenopathy.     Left cervical: No superficial cervical adenopathy.   Skin:     General: Skin is warm and dry.      Capillary Refill: Capillary refill takes less than 2 seconds.      Coloration: Skin is not pale.      Findings: No erythema.      Nails: There is no clubbing.      Comments: 3 mm wart on left forearm on medial side    Neurological:      Mental Status: He is alert and oriented to person, place, and time.      Cranial Nerves: No cranial nerve deficit or facial asymmetry.      Sensory: No sensory deficit.      Motor: No tremor, atrophy or abnormal muscle tone.      Coordination: Coordination normal.      Deep Tendon Reflexes: Reflexes are normal and symmetric.   Psychiatric:         Attention and Perception: He is attentive.         Mood and Affect: Mood normal.         Speech: Speech normal.         Behavior: Behavior normal.         Thought Content: Thought content normal.         Judgment: Judgment normal.       Assessment/Plan     Diagnoses and all orders for this visit:    1. Mixed hyperlipidemia (Primary)  -     atorvastatin (LIPITOR) 20 MG tablet; Take 1 tablet by mouth Daily.  Dispense: 30 tablet; Refill: 5    2. Chronic pain of both knees  -     tiZANidine (ZANAFLEX) 4 MG tablet; Take 1 tablet by mouth 2 (Two) Times a Day As Needed for Muscle Spasms.  Dispense: 60 tablet; Refill: 5  -     nabumetone (RELAFEN) 500 MG tablet; Take 1 tablet by mouth 2 (Two) Times a Day.  Dispense: 60 tablet; Refill: 5  -     gabapentin (NEURONTIN) 300 MG capsule; Take 1 capsule by mouth 3 (Three) Times a Day.  Dispense: 60 capsule;  Refill: 2    3. Insomnia secondary to depression with anxiety  -     QUEtiapine (SEROquel) 50 MG tablet; Take 3 tablets by mouth Every Night.  Dispense: 90 tablet; Refill: 2    4. Other viral warts         Patient's Body mass index is 37.61 kg/m². indicating that he is morbidly obese (BMI > 40 or > 35 with obesity - related health condition). Obesity-related health conditions include the following: osteoarthritis. Obesity is unchanged. BMI is is above average. We discussed portion control and increasing exercise..       Understands disease processes and need for medications.  Understands reasons for urgent and emergent care.  Patient (& family) verbalized agreement for treatment plan.   Emotional support and active listening provided.  Patient provided time to verbalize feelings.    LAURI/JUANITA reviewed today and consistent.  Will refill prescribed controlled medication today.  Patient is aware they cannot receive narcotics from any other provider except if under care of pain management or speciality clinic.  Risk and benefits of medication use has been reviewed.  History and physical exam exhibit continued safe and appropriate use of controlled substances.  The patient is aware of the potential for addiction and dependence.  This patient has been made aware of the appropriate use of such medications, including potential risk of somnolence, limited ability to drive and / or work safely, and potential for overdose.    It has also been made clear that these medications are for use by this patient only, without concomitant use of alcohol or other substances unless prescribed/advised by medical provider.  Patient understands they may be subject to UDS and pill counts at random.      Patient considered to be low risk for addiction due to use of single controlled medications.  Patient understands and accepts these risks.  Patient need for medication will be reassessed at each visit.  Doses will be adjusted according to  patient need and findings.    Goal of [place]: Patient will not have any adverse reactions of medication.  Patient will have reduction in neuropathic symptoms of pain with use of gabapentin as directed.  Patient will not have any drug drug interactions.    Medication Dispense Information    Gabapentin   Dispensed: 10/11/2021 12:00 AM   Written:  8/16/2021   Unit strength: 300MG   Days supply: 20   Dispense Note: GivenName=Melissa=RENAEBirthDate=[id number]=[address] [place], [address]   Quantity: 60 each   Pharmacy: [facility]   Authorizing provider: ENID LYLE   Received from: [facility] [facility] (Fill History)   Brand or Generic:       RTC 3-4 months, sooner if needed.     Patient plans to obtain his flu vaccine after he has received his second covid vaccine.     PROCEDURE NOTE:    Procedure:Wart removal by ligature  The procedure risks and benefits were explained to patient and the patient gave informed verbal consent to have the procedure performed.  Indication:  Patient reports wart is painful and clothing is rubbing.  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  Provider: ARTUR Rocha, BO-C  Description: The left forearm was prepped and draped in sterile fashion.  Aerosol Lidocaine used to for anesthesia.   Area cleaned with betadine and alcholol.  5-0 suture was used to encompass the wart and the wart was lifted upward with tweezers and the suture was tied multiple times surrounding the wart base.   No consultations needed.   Estimated blood loss: Minimal  Patient tolerated the procedure well.            This document has been electronically signed by:  ARTUR Brothers, BO-C    Dragon disclaimer:  Part of this note may be an electronic transcription/translation of spoken language to printed text using the Dragon Dictation System.

## 2021-11-09 ENCOUNTER — TELEPHONE (OUTPATIENT)
Dept: FAMILY MEDICINE CLINIC | Facility: CLINIC | Age: 36
End: 2021-11-09

## 2021-11-09 DIAGNOSIS — M25.562 CHRONIC PAIN OF BOTH KNEES: ICD-10-CM

## 2021-11-09 DIAGNOSIS — F51.05 INSOMNIA SECONDARY TO DEPRESSION WITH ANXIETY: ICD-10-CM

## 2021-11-09 DIAGNOSIS — M25.561 CHRONIC PAIN OF BOTH KNEES: ICD-10-CM

## 2021-11-09 DIAGNOSIS — F41.8 INSOMNIA SECONDARY TO DEPRESSION WITH ANXIETY: ICD-10-CM

## 2021-11-09 DIAGNOSIS — G89.29 CHRONIC PAIN OF BOTH KNEES: ICD-10-CM

## 2021-11-09 RX ORDER — NABUMETONE 500 MG/1
TABLET, FILM COATED ORAL
Qty: 60 TABLET | Refills: 5 | Status: SHIPPED | OUTPATIENT
Start: 2021-11-09 | End: 2022-09-16

## 2021-11-09 RX ORDER — QUETIAPINE FUMARATE 50 MG/1
150 TABLET, FILM COATED ORAL NIGHTLY
Qty: 90 TABLET | Refills: 2 | Status: SHIPPED | OUTPATIENT
Start: 2021-11-09 | End: 2022-09-16

## 2021-11-09 NOTE — TELEPHONE ENCOUNTER
Caller: ROMA MEJIA    Best call back number: [phone]    What is the call regarding:  MICHAEL STATES THAT NABUMETONE TABLET 500 MG IS NOT COVERED BY INSURANCE, BUT THE BRAND NAME IS PREFERRED NOW.  A PRIOR AUTHORIZATION CAN BE SUBMITTED BEFORE November 30 OR A MEDICATION CAN BE CHOSEN FROM THE LIST OF PREFERRRED.

## 2021-11-10 ENCOUNTER — OFFICE VISIT (OUTPATIENT)
Dept: FAMILY MEDICINE CLINIC | Facility: CLINIC | Age: 36
End: 2021-11-10

## 2021-11-10 VITALS
SYSTOLIC BLOOD PRESSURE: 128 MMHG | HEIGHT: 67 IN | TEMPERATURE: 97.9 F | DIASTOLIC BLOOD PRESSURE: 96 MMHG | WEIGHT: 235 LBS | OXYGEN SATURATION: 98 % | HEART RATE: 83 BPM | BODY MASS INDEX: 36.88 KG/M2

## 2021-11-10 DIAGNOSIS — Z00.00 HEALTHCARE MAINTENANCE: ICD-10-CM

## 2021-11-10 DIAGNOSIS — J20.9 ACUTE BRONCHITIS, UNSPECIFIED ORGANISM: Primary | ICD-10-CM

## 2021-11-10 PROCEDURE — 99213 OFFICE O/P EST LOW 20 MIN: CPT | Performed by: NURSE PRACTITIONER

## 2021-11-10 RX ORDER — AZITHROMYCIN 250 MG/1
TABLET, FILM COATED ORAL
Qty: 6 TABLET | Refills: 0 | Status: SHIPPED | OUTPATIENT
Start: 2021-11-10 | End: 2022-05-26

## 2021-11-10 RX ORDER — METHYLPREDNISOLONE 4 MG/1
TABLET ORAL
Qty: 21 TABLET | Refills: 0 | Status: SHIPPED | OUTPATIENT
Start: 2021-11-10 | End: 2022-05-26

## 2021-11-10 RX ORDER — BROMPHENIRAMINE MALEATE, PSEUDOEPHEDRINE HYDROCHLORIDE, AND DEXTROMETHORPHAN HYDROBROMIDE 2; 30; 10 MG/5ML; MG/5ML; MG/5ML
5 SYRUP ORAL 4 TIMES DAILY PRN
Qty: 118 ML | Refills: 0 | Status: SHIPPED | OUTPATIENT
Start: 2021-11-10 | End: 2022-05-26

## 2021-11-10 RX ORDER — BENZONATATE 100 MG/1
100 CAPSULE ORAL 3 TIMES DAILY PRN
Qty: 20 CAPSULE | Refills: 0 | Status: SHIPPED | OUTPATIENT
Start: 2021-11-10 | End: 2022-05-26

## 2021-11-10 NOTE — PROGRESS NOTES
Chief Complaint  Cough and Nasal Congestion    Cristine Mar is a 35 y.o. male who presents today to [facility] for initial evaluation cough, fever, runny nose.     HPI:   Cough  This is a new problem. The current episode started in the past 7 days. The problem has been gradually worsening. The problem occurs constantly. The cough is productive of purulent sputum. Associated symptoms include a fever and a sore throat. Nothing aggravates the symptoms. He has tried OTC cough suppressant for the symptoms. The treatment provided no relief.         Objective     Problem List:  Patient Active Problem List   Diagnosis   • Class 2 obesity due to excess calories without serious comorbidity with body mass index (BMI) of 38.0 to 38.9 in adult   • Chronic pain of both knees   • Insomnia secondary to depression with anxiety   • Verruca vulgaris   • Mixed hyperlipidemia   • Healthcare maintenance   • Gastroenteritis       Allergy:   Allergies   Allergen Reactions   • Strawberry Extract Anaphylaxis        Discontinued Medications:  There are no discontinued medications.    Current Medications:   Current Outpatient Medications   Medication Sig Dispense Refill   • albuterol (PROVENTIL) (2.5 MG/3ML) 0.083% nebulizer solution USE 1 VIAL IN NEB EVERY 4 HOURS AS NEEDED FOR SHORTNESS OF  mL 5   • atorvastatin (LIPITOR) 20 MG tablet Take 1 tablet by mouth Daily. 30 tablet 5   • gabapentin (NEURONTIN) 300 MG capsule Take 1 capsule by mouth 3 (Three) Times a Day. 60 capsule 2   • nabumetone (RELAFEN) 500 MG tablet TAKE ONE TABLET BY MOUTH TWO TIMES A DAY 60 tablet 5   • QUEtiapine (SEROquel) 50 MG tablet TAKE 3 TABLETS BY MOUTH EVERY NIGHT. 90 tablet 2   • tiZANidine (ZANAFLEX) 4 MG tablet Take 1 tablet by mouth 2 (Two) Times a Day As Needed for Muscle Spasms. 60 tablet 5   • azithromycin (Zithromax) 250 MG tablet Take 2 tablets the first day, then 1 tablet daily for 4 days. 6 tablet  0   • benzonatate (Tessalon Perles) 100 MG capsule Take 1 capsule by mouth 3 (Three) Times a Day As Needed for Cough. 20 capsule 0   • brompheniramine-pseudoephedrine-DM (Bromfed DM) 30-2-10 MG/5ML syrup Take 5 mL by mouth 4 (Four) Times a Day As Needed for Allergies. 118 mL 0   • methylPREDNISolone (MEDROL) 4 MG dose pack Take as directed on package instructions. 21 tablet 0     No current facility-administered medications for this visit.       Past Medical History:  Past Medical History:   Diagnosis Date   • Heart attack (HCC) 2009       Past Surgical History:  History reviewed. No pertinent surgical history.    Review of Systems:  Review of Systems   Constitutional: Positive for fever.   HENT: Positive for sore throat.    Respiratory: Positive for cough.    All other systems reviewed and are negative.      Physical Exam:  Physical Exam  Vitals and nursing note reviewed.   Constitutional:       General: He is not in acute distress.     Appearance: Normal appearance. He is not ill-appearing.      Interventions: Face mask in place.   HENT:      Head: Normocephalic and atraumatic.      Nose: Nose normal.      Mouth/Throat:      Mouth: Mucous membranes are moist.      Pharynx: Oropharynx is clear.   Eyes:      Pupils: Pupils are equal, round, and reactive to light.   Cardiovascular:      Rate and Rhythm: Normal rate and regular rhythm.      Pulses: Normal pulses.   Pulmonary:      Effort: Pulmonary effort is normal. No respiratory distress.      Breath sounds: Wheezing (on inspiration) present.   Abdominal:      Palpations: Abdomen is soft.   Musculoskeletal:         General: Normal range of motion.      Cervical back: Normal range of motion and neck supple.   Skin:     General: Skin is warm and dry.      Capillary Refill: Capillary refill takes less than 2 seconds.   Neurological:      Mental Status: He is alert and oriented to person, place, and time.   Psychiatric:         Mood and Affect: Mood normal.          "Behavior: Behavior normal.         Vital Signs:   /96 (BP Location: Left arm, Patient Position: Sitting)   Pulse 83   Temp 97.9 °F (36.6 °C)   Ht 170.2 cm (67\")   Wt 107 kg (235 lb)   SpO2 98%   BMI 36.81 kg/m²      Lab Results:   Lab on 06/16/2021   Component Date Value Ref Range Status   • Glucose 06/16/2021 83  65 - 99 mg/dL Final   • BUN 06/16/2021 8  6 - 20 mg/dL Final   • Creatinine 06/16/2021 0.76  0.76 - 1.27 mg/dL Final   • Sodium 06/16/2021 142  136 - 145 mmol/L Final   • Potassium 06/16/2021 4.1  3.5 - 5.2 mmol/L Final   • Chloride 06/16/2021 108* 98 - 107 mmol/L Final   • CO2 06/16/2021 22.4  22.0 - 29.0 mmol/L Final   • Calcium 06/16/2021 8.5* 8.6 - 10.5 mg/dL Final   • Total Protein 06/16/2021 6.6  6.0 - 8.5 g/dL Final   • Albumin 06/16/2021 4.20  3.50 - 5.20 g/dL Final   • ALT (SGPT) 06/16/2021 37  1 - 41 U/L Final   • AST (SGOT) 06/16/2021 27  1 - 40 U/L Final   • Alkaline Phosphatase 06/16/2021 81  39 - 117 U/L Final   • Total Bilirubin 06/16/2021 0.3  0.0 - 1.2 mg/dL Final   • eGFR Non  Amer 06/16/2021 117  >60 mL/min/1.73 Final   • Globulin 06/16/2021 2.4  gm/dL Final   • A/G Ratio 06/16/2021 1.8  g/dL Final   • BUN/Creatinine Ratio 06/16/2021 10.5  7.0 - 25.0 Final   • Anion Gap 06/16/2021 11.6  5.0 - 15.0 mmol/L Final   • Total Cholesterol 06/16/2021 190  0 - 200 mg/dL Final   • Triglycerides 06/16/2021 315* 0 - 150 mg/dL Final   • HDL Cholesterol 06/16/2021 29* 40 - 60 mg/dL Final   • LDL Cholesterol  06/16/2021 107* 0 - 100 mg/dL Final   • VLDL Cholesterol 06/16/2021 54* 5 - 40 mg/dL Final   • LDL/HDL Ratio 06/16/2021 3.38   Final   • WBC 06/16/2021 6.23  3.40 - 10.80 10*3/mm3 Final   • RBC 06/16/2021 5.07  4.14 - 5.80 10*6/mm3 Final   • Hemoglobin 06/16/2021 15.9  13.0 - 17.7 g/dL Final   • Hematocrit 06/16/2021 46.2  37.5 - 51.0 % Final   • MCV 06/16/2021 91.1  79.0 - 97.0 fL Final   • MCH 06/16/2021 31.4  26.6 - 33.0 pg Final   • MCHC 06/16/2021 34.4  31.5 - 35.7 g/dL " Final   • RDW 06/16/2021 13.1  12.3 - 15.4 % Final   • RDW-SD 06/16/2021 43.3  37.0 - 54.0 fl Final   • MPV 06/16/2021 11.8  6.0 - 12.0 fL Final   • Platelets 06/16/2021 179  140 - 450 10*3/mm3 Final   • Neutrophil % 06/16/2021 59.9  42.7 - 76.0 % Final   • Lymphocyte % 06/16/2021 30.7  19.6 - 45.3 % Final   • Monocyte % 06/16/2021 6.6  5.0 - 12.0 % Final   • Eosinophil % 06/16/2021 2.2  0.3 - 6.2 % Final   • Basophil % 06/16/2021 0.3  0.0 - 1.5 % Final   • Immature Grans % 06/16/2021 0.3  0.0 - 0.5 % Final   • Neutrophils, Absolute 06/16/2021 3.73  1.70 - 7.00 10*3/mm3 Final   • Lymphocytes, Absolute 06/16/2021 1.91  0.70 - 3.10 10*3/mm3 Final   • Monocytes, Absolute 06/16/2021 0.41  0.10 - 0.90 10*3/mm3 Final   • Eosinophils, Absolute 06/16/2021 0.14  0.00 - 0.40 10*3/mm3 Final   • Basophils, Absolute 06/16/2021 0.02  0.00 - 0.20 10*3/mm3 Final   • Immature Grans, Absolute 06/16/2021 0.02  0.00 - 0.05 10*3/mm3 Final   • nRBC 06/16/2021 0.0  0.0 - 0.2 /100 WBC Final       EKG Results:  No orders to display       Imaging Results:  No Images in the past 120 days found..              Assessment and Plan   Diagnoses and all orders for this visit:    1. Acute bronchitis, unspecified organism (Primary)  Comments:  smoking cessation advised  Orders:  -     methylPREDNISolone (MEDROL) 4 MG dose pack; Take as directed on package instructions.  Dispense: 21 tablet; Refill: 0  -     azithromycin (Zithromax) 250 MG tablet; Take 2 tablets the first day, then 1 tablet daily for 4 days.  Dispense: 6 tablet; Refill: 0  -     brompheniramine-pseudoephedrine-DM (Bromfed DM) 30-2-10 MG/5ML syrup; Take 5 mL by mouth 4 (Four) Times a Day As Needed for Allergies.  Dispense: 118 mL; Refill: 0  -     benzonatate (Tessalon Perles) 100 MG capsule; Take 1 capsule by mouth 3 (Three) Times a Day As Needed for Cough.  Dispense: 20 capsule; Refill: 0    2. Healthcare maintenance  Comments:  offered flu vaccine, patient just had covid vaccine  series, wishes to wait a few more weeks on flu vaccine        Meds ordered during this visit:  New Medications Ordered This Visit   Medications   • methylPREDNISolone (MEDROL) 4 MG dose pack     Sig: Take as directed on package instructions.     Dispense:  21 tablet     Refill:  0   • azithromycin (Zithromax) 250 MG tablet     Sig: Take 2 tablets the first day, then 1 tablet daily for 4 days.     Dispense:  6 tablet     Refill:  0   • brompheniramine-pseudoephedrine-DM (Bromfed DM) 30-2-10 MG/5ML syrup     Sig: Take 5 mL by mouth 4 (Four) Times a Day As Needed for Allergies.     Dispense:  118 mL     Refill:  0   • benzonatate (Tessalon Perles) 100 MG capsule     Sig: Take 1 capsule by mouth 3 (Three) Times a Day As Needed for Cough.     Dispense:  20 capsule     Refill:  0       Patient Instructions:  Patient instructions given for the following visit diagnosis:    ICD-10-CM ICD-9-CM   1. Acute bronchitis, unspecified organism  J20.9 466.0   2. Healthcare maintenance  Z00.00 V70.0       Follow Up   Return if symptoms worsen or fail to improve.        This document has been electronically signed by ARTUR Maldonado  November 10, 2021 10:51 EST    Patient was given instructions and counseling regarding his condition or for health maintenance advice. Please see specific information pulled into the AVS if appropriate.     Part of this note may be an electronic transcription/translation of spoken language to printed text using the Dragon Dictation System.

## 2022-05-26 ENCOUNTER — OFFICE VISIT (OUTPATIENT)
Dept: FAMILY MEDICINE CLINIC | Facility: CLINIC | Age: 37
End: 2022-05-26

## 2022-05-26 DIAGNOSIS — J40 BRONCHITIS: ICD-10-CM

## 2022-05-26 DIAGNOSIS — K21.9 GASTROESOPHAGEAL REFLUX DISEASE WITHOUT ESOPHAGITIS: Primary | Chronic | ICD-10-CM

## 2022-05-26 DIAGNOSIS — R63.4 WEIGHT LOSS: ICD-10-CM

## 2022-05-26 DIAGNOSIS — R10.30 LOWER ABDOMINAL PAIN: ICD-10-CM

## 2022-05-26 PROCEDURE — 99214 OFFICE O/P EST MOD 30 MIN: CPT | Performed by: NURSE PRACTITIONER

## 2022-05-26 NOTE — PROGRESS NOTES
History of Present Illness  León Mar is a 36 y.o. male presents to the clinic today for [facility] ED 2022 follow up. His ED record reports he was seen for c/o hemoptysis with cough, pleuritic chest pain. His discharge diagnosis was Acute bronchitis, Acute Hypokalemia  and GERD. He was discharged on several medications he has not started. Today, he is c/o weight loss and lower abdominal pain.  He reports hematemesis without nausea with associated intermittent dark stools.   León reports his symptoms started several years ago and had extensive GI work up per Dr Silverio with final diagnosis of GERD. His father  of gastric cancer.     The following portions of the patient's history were reviewed and updated as appropriate: allergies, current medications, past family history, past medical history, past social history, past surgical history and problem list.    Review of Systems   Constitutional: Positive for appetite change, fatigue and fever (for two weeks at night). Negative for activity change, chills and unexpected weight change.   HENT: Negative.    Eyes: Negative for visual disturbance.   Respiratory: Positive for cough. Negative for shortness of breath and wheezing.    Cardiovascular: Negative for chest pain, palpitations and leg swelling.   Gastrointestinal: Positive for abdominal pain, blood in stool, nausea and vomiting. Negative for constipation and diarrhea.   Endocrine: Negative for cold intolerance, heat intolerance, polydipsia, polyphagia and polyuria.   Genitourinary: Negative for decreased urine volume, difficulty urinating, dysuria, frequency, hematuria and urgency.   Skin: Negative for color change and rash.   Neurological: Positive for weakness. Negative for dizziness, tremors, speech difficulty, light-headedness and headaches.   Hematological: Negative for adenopathy. Does not bruise/bleed easily.   Psychiatric/Behavioral: Positive for sleep disturbance. Negative for confusion, decreased  "concentration and suicidal ideas. The patient is not nervous/anxious.    All other systems reviewed and are negative.    Vital signs:  /80 (BP Location: Left arm, Patient Position: Sitting, Cuff Size: Adult)   Pulse 90   Temp 97.3 °F (36.3 °C) (Temporal)   Ht 170.2 cm (67.01\")   Wt 102 kg (224 lb)   SpO2 98%   BMI 35.07 kg/m²     Physical Exam  Vitals and nursing note reviewed.   Constitutional:       General: He is not in acute distress.     Appearance: He is well-developed.      Interventions: Face mask in place.   HENT:      Nose: Nose normal.   Eyes:      General: No scleral icterus.        Right eye: No discharge.         Left eye: No discharge.      Conjunctiva/sclera: Conjunctivae normal.   Neck:      Thyroid: No thyromegaly.      Vascular: No JVD.   Cardiovascular:      Rate and Rhythm: Normal rate and regular rhythm.      Heart sounds: Normal heart sounds. No murmur heard.    No friction rub.   Pulmonary:      Effort: Pulmonary effort is normal. No respiratory distress.      Breath sounds: Normal breath sounds. No wheezing or rales.   Abdominal:      General: Bowel sounds are normal. There is no distension.      Palpations: Abdomen is soft.      Tenderness: There is abdominal tenderness in the right lower quadrant, suprapubic area and left lower quadrant. There is rebound. There is no right CVA tenderness, left CVA tenderness or guarding.      Comments: Worse on right side    Musculoskeletal:         General: No tenderness.      Cervical back: Neck supple.   Lymphadenopathy:      Cervical: No cervical adenopathy.   Skin:     General: Skin is warm and dry.      Capillary Refill: Capillary refill takes less than 2 seconds.      Findings: No erythema or rash.   Neurological:      Mental Status: He is alert and oriented to person, place, and time.   Psychiatric:         Mood and Affect: Mood normal.         Speech: Speech normal.         Behavior: Behavior is cooperative.         Thought Content: " Thought content normal.         Cognition and Memory: Cognition and memory normal.       Result Review : [facility] ED record 5/24/2022  Class 2 Severe Obesity (BMI >=35 and <=39.9). Obesity-related health conditions include the following: hypertension and GERD. Obesity is improving with lifestyle modifications although he is concerned about his weight loss. BMI  is above average; BMI management plan is completed.      Assessment & Plan     Diagnoses and all orders for this visit:    1. Gastroesophageal reflux disease without esophagitis (Primary)  Comments:  Not at goal.   ED recommendation for medications include Famotidine 40 mg bid and Sucralfate 1 GR qid     2. Lower abdominal pain  Comments:  Sent to ED for further evaluation     3. Bronchitis  Comments:  Per ED records recommended Albuterol HFA and smoking cessation     4. Weight loss  Comments:  Weight loss since 7/21/21 to today 19 lbs. Will evaluate when  f/u     Follow Up Per ED recommendations   Findings and recommendations discussed with León. Reviewed [facility] records including labs/imaging. Due to his current symptoms and his complaints, I felt he needed more urgent care which he agreed. He agreed to go to the ED for further evaluation. He will follow up per ED's recommendation.  León was given instructions and counseling regarding his condition or for health maintenance advice. Please see specific information pulled into the AVS if appropriate    This document has been electronically signed by:

## 2022-05-29 VITALS
SYSTOLIC BLOOD PRESSURE: 126 MMHG | WEIGHT: 224 LBS | HEIGHT: 67 IN | HEART RATE: 90 BPM | TEMPERATURE: 97.3 F | DIASTOLIC BLOOD PRESSURE: 80 MMHG | OXYGEN SATURATION: 98 % | BODY MASS INDEX: 35.16 KG/M2

## 2022-05-29 PROBLEM — K21.9 GASTROESOPHAGEAL REFLUX DISEASE WITHOUT ESOPHAGITIS: Status: ACTIVE | Noted: 2022-05-29

## 2022-09-16 ENCOUNTER — OFFICE VISIT (OUTPATIENT)
Dept: FAMILY MEDICINE CLINIC | Facility: CLINIC | Age: 37
End: 2022-09-16

## 2022-09-16 ENCOUNTER — TELEPHONE (OUTPATIENT)
Dept: FAMILY MEDICINE CLINIC | Facility: CLINIC | Age: 37
End: 2022-09-16

## 2022-09-16 DIAGNOSIS — M25.562 CHRONIC PAIN OF BOTH KNEES: Chronic | ICD-10-CM

## 2022-09-16 DIAGNOSIS — K21.9 GASTROESOPHAGEAL REFLUX DISEASE WITHOUT ESOPHAGITIS: Chronic | ICD-10-CM

## 2022-09-16 DIAGNOSIS — I10 PRIMARY HYPERTENSION: Primary | Chronic | ICD-10-CM

## 2022-09-16 DIAGNOSIS — R89.9 ABNORMAL LABORATORY TEST: ICD-10-CM

## 2022-09-16 DIAGNOSIS — G89.29 CHRONIC PAIN OF BOTH KNEES: Chronic | ICD-10-CM

## 2022-09-16 DIAGNOSIS — Z23 ENCOUNTER FOR IMMUNIZATION: ICD-10-CM

## 2022-09-16 DIAGNOSIS — E55.9 VITAMIN D DEFICIENCY: ICD-10-CM

## 2022-09-16 DIAGNOSIS — M25.561 CHRONIC PAIN OF BOTH KNEES: Chronic | ICD-10-CM

## 2022-09-16 DIAGNOSIS — E78.5 DYSLIPIDEMIA: Chronic | ICD-10-CM

## 2022-09-16 PROBLEM — F51.05 INSOMNIA SECONDARY TO DEPRESSION WITH ANXIETY: Status: RESOLVED | Noted: 2020-04-20 | Resolved: 2022-09-16

## 2022-09-16 PROBLEM — F41.8 INSOMNIA SECONDARY TO DEPRESSION WITH ANXIETY: Status: RESOLVED | Noted: 2020-04-20 | Resolved: 2022-09-16

## 2022-09-16 PROCEDURE — 90686 IIV4 VACC NO PRSV 0.5 ML IM: CPT | Performed by: NURSE PRACTITIONER

## 2022-09-16 PROCEDURE — 80053 COMPREHEN METABOLIC PANEL: CPT | Performed by: NURSE PRACTITIONER

## 2022-09-16 PROCEDURE — 83036 HEMOGLOBIN GLYCOSYLATED A1C: CPT | Performed by: NURSE PRACTITIONER

## 2022-09-16 PROCEDURE — 90471 IMMUNIZATION ADMIN: CPT | Performed by: NURSE PRACTITIONER

## 2022-09-16 PROCEDURE — 80061 LIPID PANEL: CPT | Performed by: NURSE PRACTITIONER

## 2022-09-16 PROCEDURE — 82607 VITAMIN B-12: CPT | Performed by: NURSE PRACTITIONER

## 2022-09-16 PROCEDURE — 84443 ASSAY THYROID STIM HORMONE: CPT | Performed by: NURSE PRACTITIONER

## 2022-09-16 PROCEDURE — 85025 COMPLETE CBC W/AUTO DIFF WBC: CPT | Performed by: NURSE PRACTITIONER

## 2022-09-16 PROCEDURE — 82306 VITAMIN D 25 HYDROXY: CPT | Performed by: NURSE PRACTITIONER

## 2022-09-16 PROCEDURE — 99214 OFFICE O/P EST MOD 30 MIN: CPT | Performed by: NURSE PRACTITIONER

## 2022-09-16 RX ORDER — TIZANIDINE 4 MG/1
4 TABLET ORAL 2 TIMES DAILY PRN
Qty: 60 TABLET | Refills: 0 | Status: SHIPPED | OUTPATIENT
Start: 2022-09-16

## 2022-09-16 RX ORDER — LOSARTAN POTASSIUM 25 MG/1
25 TABLET ORAL NIGHTLY
Qty: 30 TABLET | Refills: 0 | Status: SHIPPED | OUTPATIENT
Start: 2022-09-16

## 2022-09-16 RX ORDER — MELOXICAM 15 MG/1
15 TABLET ORAL DAILY
Qty: 30 TABLET | Refills: 0 | Status: SHIPPED | OUTPATIENT
Start: 2022-09-16

## 2022-09-16 RX ORDER — ATORVASTATIN CALCIUM 20 MG/1
20 TABLET, FILM COATED ORAL DAILY
Qty: 30 TABLET | Refills: 0 | Status: SHIPPED | OUTPATIENT
Start: 2022-09-16

## 2022-09-16 NOTE — TELEPHONE ENCOUNTER
----- Message from ARTUR eWaver sent at 9/16/2022  2:54 PM EDT -----  He just came in for a visit.  I did order labs on him including a CBC.  After I receive the results of this I will make a decision thanks  ----- Message -----  From: Jamila Garrett MA  Sent: 9/16/2022   1:29 PM EDT  To: ARTUR Weaver    Hematology/oncology is calling about referral that you placed on 5/25 for patient for elevated white blood cell count, they would like to know if you want to close referral or keep it open.    If you would like them to keep referral open, they would need more blood work showing patient has had history of white blood cell count being elevated prior to referral.       Please advise.

## 2022-09-16 NOTE — PROGRESS NOTES
History of Present Illness  León Mar is a 36 y.o. male who presents at the clinic today with concerns of his blood pressure.  He was recently seen at urgent care with an elevated blood pressure of 160/100.  He was instructed to see his primary care provider for medication initiation.  In addition, he has been diagnosed with dyslipidemia, GERD, and chronic pain of his knees.  He has recently started working in [employer].    Dyslipidemia  The current episode started more than 1 year ago. Pertinent negatives include no chest pain or shortness of breath. Current antihyperlipidemic treatment includes statins. Risk factors for coronary artery disease include dyslipidemia and male sex.     Elevated blood pressure   Risk factors for coronary artery disease include dyslipidemia, male gender and smoking/tobacco exposure.     GERD  He reports improved with his weight loss. Risk factors include smoking/tobacco exposure. He has tried a PPI for the symptoms.     Knee Pain   The pain is present in the right toes and left knee. Associated symptoms include muscle weakness. Previously was prescribed gabapentin which he would like to restart.    The following portions of the patient's history were reviewed and updated as appropriate: allergies, current medications, past family history, past medical history, past social history, past surgical history and problem list.    Review of Systems   Constitutional: Positive for appetite change. Negative for activity change, chills, fatigue, fever and unexpected weight change.   HENT: Negative.    Eyes: Negative for visual disturbance.   Respiratory: Negative for cough, shortness of breath and wheezing.    Cardiovascular: Negative for chest pain, palpitations and leg swelling.   Gastrointestinal: Negative for abdominal pain, blood in stool, constipation, diarrhea, nausea and vomiting.   Endocrine: Negative for cold intolerance, heat intolerance, polydipsia, polyphagia and polyuria.  "  Genitourinary: Negative for decreased urine volume, difficulty urinating, dysuria, frequency, hematuria and urgency.   Musculoskeletal: Positive for arthralgias.   Skin: Negative for color change and rash.   Neurological: Positive for dizziness and weakness. Negative for tremors, speech difficulty, light-headedness and headaches.   Hematological: Negative for adenopathy. Does not bruise/bleed easily.   Psychiatric/Behavioral: Negative for confusion, decreased concentration, sleep disturbance and suicidal ideas. The patient is not nervous/anxious.    All other systems reviewed and are negative.        Vital signs:  /88 (BP Location: Left arm, Patient Position: Sitting, Cuff Size: Adult)   Pulse 85   Temp 98.6 °F (37 °C) (Temporal)   Resp 14   Ht 170.2 cm (67.01\")   Wt 104 kg (229 lb)   SpO2 98%   BMI 35.86 kg/m²     Physical Exam  Vitals and nursing note reviewed.   Constitutional:       General: He is not in acute distress.     Appearance: He is well-developed.   HENT:      Head: Normocephalic.      Nose: Nose normal.   Eyes:      General: No scleral icterus.        Right eye: No discharge.         Left eye: No discharge.      Conjunctiva/sclera: Conjunctivae normal.   Neck:      Thyroid: No thyromegaly.      Vascular: No JVD.   Cardiovascular:      Rate and Rhythm: Normal rate and regular rhythm.      Heart sounds: Normal heart sounds. No murmur heard.    No friction rub.   Pulmonary:      Effort: Pulmonary effort is normal. No respiratory distress.      Breath sounds: Normal breath sounds. No wheezing or rales.   Abdominal:      General: Bowel sounds are normal. There is no distension.      Palpations: Abdomen is soft.      Tenderness: There is no abdominal tenderness. There is no guarding or rebound.   Musculoskeletal:         General: No tenderness.      Cervical back: Neck supple.      Right lower leg: No edema.      Left lower leg: No edema.   Lymphadenopathy:      Cervical: No cervical " adenopathy.   Skin:     General: Skin is warm and dry.      Capillary Refill: Capillary refill takes less than 2 seconds.   Neurological:      Mental Status: He is alert and oriented to person, place, and time.      Cranial Nerves: Cranial nerves are intact.   Psychiatric:         Mood and Affect: Mood and affect normal.         Speech: Speech normal.         Behavior: Behavior is cooperative.         Thought Content: Thought content normal.         Cognition and Memory: Cognition and memory normal.     Result Review : Requested urgent care visit note  Class 2 Severe Obesity (BMI >=35 and <=39.9). Obesity-related health conditions include the following: GERD. Obesity is improving with lifestyle modifications. BMI is is above average; BMI management plan is completed.  Provided information on portion control and increasing exercise.     Assessment & Plan     Diagnoses and all orders for this visit:    1. Primary hypertension (Primary)  Comments:  Cardiovascular risk reduction modifications including nutrition and activity recommendations encouraged losartan 25mg prescribed  Orders:  -     losartan (COZAAR) 25 MG tablet; Take 1 tablet by mouth Every Night.  Dispense: 30 tablet; Refill: 0  -     Comprehensive Metabolic Panel  -     Lipid Panel  -     TSH    2. Dyslipidemia  Comments:  Atorvastatin continued.  Lipid panel ordered today  Orders:  -     atorvastatin (LIPITOR) 20 MG tablet; Take 1 tablet by mouth Daily.  Dispense: 30 tablet; Refill: 0  -     Comprehensive Metabolic Panel  -     Lipid Panel  -     TSH    3. Chronic pain of both knees  Comments:  Treatment options reviewed.  Trial of Mobic 15 mg daily prescribed  Orders:  -     tiZANidine (ZANAFLEX) 4 MG tablet; Take 1 tablet by mouth 2 (Two) Times a Day As Needed for Muscle Spasms.  Dispense: 60 tablet; Refill: 0  -     meloxicam (Mobic) 15 MG tablet; Take 1 tablet by mouth Daily.  Dispense: 30 tablet; Refill: 0    4. Gastroesophageal reflux disease without  esophagitis  Comments:  Stable with lifestyle modifications including food choices.  Encourage smoking cessation  Orders:  -     CBC & Differential    5. Vitamin D deficiency  Comments:  Vitamin D level ordered  Orders:  -     Vitamin D 25 Hydroxy    6. Abnormal laboratory test  Comments:  Additional labs ordered  Orders:  -     Hemoglobin A1c  -     Vitamin B12    7. Encounter for immunization  Comments:  Influenza vaccination given today  Orders:  -     FluLaval/Fluzone >6 mos ([id number])    Follow Up in October  Findings and recommendations discussed with León. Reviewed treatment options and will start him on losartan low-dose at night.  Side effects discussed.  Lifestyle modifications reinforced including nutrition and activity recommendations.  Encouraged smoking cessation.  León will follow up in October sooner if problems/concerns occur.  He was given instructions and counseling regarding his condition or for health maintenance advice. Please see specific information pulled into the AVS if appropriate    This document has been electronically signed by:

## 2022-09-16 NOTE — PROGRESS NOTES
Injection  Injection performed in right deltoid by Kathy Treviño MA. Patient tolerated the procedure well without complications.  09/16/22   Kathy Treviño MA

## 2022-09-17 VITALS
HEIGHT: 67 IN | TEMPERATURE: 98.6 F | WEIGHT: 229 LBS | DIASTOLIC BLOOD PRESSURE: 88 MMHG | SYSTOLIC BLOOD PRESSURE: 136 MMHG | HEART RATE: 85 BPM | OXYGEN SATURATION: 98 % | RESPIRATION RATE: 14 BRPM | BODY MASS INDEX: 35.94 KG/M2

## 2022-09-17 LAB
25(OH)D3 SERPL-MCNC: 34.3 NG/ML (ref 30–100)
ALBUMIN SERPL-MCNC: 4.8 G/DL (ref 3.5–5.2)
ALBUMIN/GLOB SERPL: 1.8 G/DL
ALP SERPL-CCNC: 88 U/L (ref 39–117)
ALT SERPL W P-5'-P-CCNC: 20 U/L (ref 1–41)
ANION GAP SERPL CALCULATED.3IONS-SCNC: 7.7 MMOL/L (ref 5–15)
AST SERPL-CCNC: 17 U/L (ref 1–40)
BASOPHILS # BLD AUTO: 0.03 10*3/MM3 (ref 0–0.2)
BASOPHILS NFR BLD AUTO: 0.5 % (ref 0–1.5)
BILIRUB SERPL-MCNC: 0.4 MG/DL (ref 0–1.2)
BUN SERPL-MCNC: 11 MG/DL (ref 6–20)
BUN/CREAT SERPL: 11.8 (ref 7–25)
CALCIUM SPEC-SCNC: 9.3 MG/DL (ref 8.6–10.5)
CHLORIDE SERPL-SCNC: 106 MMOL/L (ref 98–107)
CHOLEST SERPL-MCNC: 188 MG/DL (ref 0–200)
CO2 SERPL-SCNC: 29.3 MMOL/L (ref 22–29)
CREAT SERPL-MCNC: 0.93 MG/DL (ref 0.76–1.27)
DEPRECATED RDW RBC AUTO: 43.6 FL (ref 37–54)
EGFRCR SERPLBLD CKD-EPI 2021: 109.8 ML/MIN/1.73
EOSINOPHIL # BLD AUTO: 0.13 10*3/MM3 (ref 0–0.4)
EOSINOPHIL NFR BLD AUTO: 2.1 % (ref 0.3–6.2)
ERYTHROCYTE [DISTWIDTH] IN BLOOD BY AUTOMATED COUNT: 13 % (ref 12.3–15.4)
GLOBULIN UR ELPH-MCNC: 2.7 GM/DL
GLUCOSE SERPL-MCNC: 72 MG/DL (ref 65–99)
HBA1C MFR BLD: 4.6 % (ref 4.8–5.6)
HCT VFR BLD AUTO: 47.9 % (ref 37.5–51)
HDLC SERPL-MCNC: 34 MG/DL (ref 40–60)
HGB BLD-MCNC: 16.3 G/DL (ref 13–17.7)
IMM GRANULOCYTES # BLD AUTO: 0.01 10*3/MM3 (ref 0–0.05)
IMM GRANULOCYTES NFR BLD AUTO: 0.2 % (ref 0–0.5)
LDLC SERPL CALC-MCNC: 116 MG/DL (ref 0–100)
LDLC/HDLC SERPL: 3.25 {RATIO}
LYMPHOCYTES # BLD AUTO: 2.17 10*3/MM3 (ref 0.7–3.1)
LYMPHOCYTES NFR BLD AUTO: 34.3 % (ref 19.6–45.3)
MCH RBC QN AUTO: 31.3 PG (ref 26.6–33)
MCHC RBC AUTO-ENTMCNC: 34 G/DL (ref 31.5–35.7)
MCV RBC AUTO: 92.1 FL (ref 79–97)
MONOCYTES # BLD AUTO: 0.4 10*3/MM3 (ref 0.1–0.9)
MONOCYTES NFR BLD AUTO: 6.3 % (ref 5–12)
NEUTROPHILS NFR BLD AUTO: 3.59 10*3/MM3 (ref 1.7–7)
NEUTROPHILS NFR BLD AUTO: 56.6 % (ref 42.7–76)
NRBC BLD AUTO-RTO: 0 /100 WBC (ref 0–0.2)
PLATELET # BLD AUTO: 176 10*3/MM3 (ref 140–450)
PMV BLD AUTO: 11.6 FL (ref 6–12)
POTASSIUM SERPL-SCNC: 4.2 MMOL/L (ref 3.5–5.2)
PROT SERPL-MCNC: 7.5 G/DL (ref 6–8.5)
RBC # BLD AUTO: 5.2 10*6/MM3 (ref 4.14–5.8)
SODIUM SERPL-SCNC: 143 MMOL/L (ref 136–145)
TRIGL SERPL-MCNC: 217 MG/DL (ref 0–150)
TSH SERPL DL<=0.05 MIU/L-ACNC: 1.26 UIU/ML (ref 0.27–4.2)
VIT B12 BLD-MCNC: 254 PG/ML (ref 211–946)
VLDLC SERPL-MCNC: 38 MG/DL (ref 5–40)
WBC NRBC COR # BLD: 6.33 10*3/MM3 (ref 3.4–10.8)

## 2022-09-19 ENCOUNTER — TELEPHONE (OUTPATIENT)
Dept: FAMILY MEDICINE CLINIC | Facility: CLINIC | Age: 37
End: 2022-09-19

## 2022-09-19 NOTE — TELEPHONE ENCOUNTER
----- Message from ARTUR Weaver sent at 9/17/2022  4:34 PM EDT -----  I do not see that that is necessary.  Thanks  ----- Message -----  From: Jamila Garrett MA  Sent: 9/16/2022   1:29 PM EDT  To: ARTUR Weaver    Hematology/oncology is calling about referral that you placed on 5/25 for patient for elevated white blood cell count, they would like to know if you want to close referral or keep it open.    If you would like them to keep referral open, they would need more blood work showing patient has had history of white blood cell count being elevated prior to referral.       Please advise.

## 2022-09-27 ENCOUNTER — HOSPITAL ENCOUNTER (EMERGENCY)
Facility: HOSPITAL | Age: 37
Discharge: HOME | End: 2022-09-27
Attending: EMERGENCY MEDICINE | Admitting: EMERGENCY MEDICINE

## 2022-09-27 VITALS
HEIGHT: 68 IN | WEIGHT: 229 LBS | DIASTOLIC BLOOD PRESSURE: 97 MMHG | SYSTOLIC BLOOD PRESSURE: 163 MMHG | OXYGEN SATURATION: 100 % | BODY MASS INDEX: 34.71 KG/M2 | RESPIRATION RATE: 18 BRPM | TEMPERATURE: 98.2 F | HEART RATE: 89 BPM

## 2022-09-27 DIAGNOSIS — S61.012A LACERATION OF LEFT THUMB WITHOUT FOREIGN BODY WITHOUT DAMAGE TO NAIL, INITIAL ENCOUNTER: Primary | ICD-10-CM

## 2022-09-27 PROCEDURE — 99283 EMERGENCY DEPT VISIT LOW MDM: CPT

## 2022-09-27 RX ORDER — CEPHALEXIN 500 MG/1
500 CAPSULE ORAL 3 TIMES DAILY
Qty: 15 CAPSULE | Refills: 0 | Status: SHIPPED | OUTPATIENT
Start: 2022-09-27 | End: 2022-10-02

## 2022-09-27 RX ORDER — CEPHALEXIN 250 MG/1
500 CAPSULE ORAL ONCE
Status: COMPLETED | OUTPATIENT
Start: 2022-09-27 | End: 2022-09-27

## 2022-09-27 RX ADMIN — CEPHALEXIN 500 MG: 250 CAPSULE ORAL at 19:41

## 2023-09-20 ENCOUNTER — TELEMEDICINE (OUTPATIENT)
Dept: FAMILY MEDICINE CLINIC | Facility: TELEHEALTH | Age: 38
End: 2023-09-20
Payer: COMMERCIAL

## 2023-09-20 DIAGNOSIS — A08.4 VIRAL GASTROENTERITIS: Primary | ICD-10-CM

## 2023-09-20 RX ORDER — PROMETHAZINE HYDROCHLORIDE 25 MG/1
25 TABLET ORAL EVERY 6 HOURS PRN
Qty: 12 TABLET | Refills: 0 | Status: SHIPPED | OUTPATIENT
Start: 2023-09-20

## 2023-09-20 NOTE — LETTER
September 20, 2023     Patient: León Mar   YOB: 1985   Date of Visit: 9/20/2023       To Whom It May Concern:    It is my medical opinion that León Mar may return to work on Friday 9/22/2023.           Sincerely,      ARTUR Vasquez    CC:   No Recipients

## 2023-09-20 NOTE — PROGRESS NOTES
Subjective   Chief Complaint   Patient presents with    Vomiting    Diarrhea           Nausea       León Mar is a 37 y.o. male.     History of Present Illness  Patient reports nausea, vomiting, diarrhea and cramping that started this morning.  Symptoms began shortly after eating [employer] but he states his wife had similar symptoms a few days ago.  GI Problem  The primary symptoms include fatigue, abdominal pain, nausea, vomiting and diarrhea. Primary symptoms do not include fever, weight loss, melena, hematemesis, jaundice, hematochezia, dysuria, myalgias, arthralgias or rash. The illness began today. The problem has not changed since onset.  The vomiting began today. Vomiting occurs 2 to 5 times per day (5 times). The emesis contains stomach contents.   The diarrhea began today. The diarrhea is watery. The diarrhea occurs continuously (every 20 min).   The illness is also significant for chills. The illness does not include anorexia, dysphagia, odynophagia, bloating, constipation, tenesmus, back pain or itching.      Allergies   Allergen Reactions    Strawberry Extract Anaphylaxis       Past Medical History:   Diagnosis Date    Heart attack 2009       History reviewed. No pertinent surgical history.    Social History     Socioeconomic History    Marital status: Single    Number of children: 1    Years of education: 12   Tobacco Use    Smoking status: Every Day     Packs/day: 0.50     Years: 16.00     Pack years: 8.00     Types: Cigarettes    Smokeless tobacco: Never   Vaping Use    Vaping Use: Never used   Substance and Sexual Activity    Alcohol use: No    Drug use: No    Sexual activity: Yes     Partners: Female       History reviewed. No pertinent family history.      Current Outpatient Medications:     atorvastatin (LIPITOR) 20 MG tablet, Take 1 tablet by mouth Daily., Disp: 30 tablet, Rfl: 0    gabapentin (NEURONTIN) 300 MG capsule, Take 1 capsule by mouth 3 (Three) Times a Day., Disp: 60 capsule, Rfl:  2    losartan (COZAAR) 25 MG tablet, Take 1 tablet by mouth Every Night., Disp: 30 tablet, Rfl: 0    meloxicam (Mobic) 15 MG tablet, Take 1 tablet by mouth Daily., Disp: 30 tablet, Rfl: 0    promethazine (PHENERGAN) 25 MG tablet, Take 1 tablet by mouth Every 6 (Six) Hours As Needed for Nausea or Vomiting., Disp: 12 tablet, Rfl: 0    tiZANidine (ZANAFLEX) 4 MG tablet, Take 1 tablet by mouth 2 (Two) Times a Day As Needed for Muscle Spasms., Disp: 60 tablet, Rfl: 0      Review of Systems   Constitutional:  Positive for activity change, appetite change, chills, diaphoresis and fatigue. Negative for fever and unexpected weight loss.   HENT: Negative.     Respiratory: Negative.     Gastrointestinal:  Positive for abdominal pain, diarrhea, nausea and vomiting. Negative for abdominal distention, anal bleeding, anorexia, bloating, blood in stool, constipation, dysphagia, hematemesis, hematochezia, jaundice, melena, rectal pain, GERD and indigestion.   Genitourinary:  Negative for dysuria.   Musculoskeletal:  Negative for arthralgias, back pain and myalgias.   Skin:  Negative for itching and rash.   Neurological:  Negative for headache.      There were no vitals filed for this visit.    Objective   Physical Exam  Constitutional:       General: He is not in acute distress.     Appearance: Normal appearance. He is not ill-appearing, toxic-appearing or diaphoretic.   HENT:      Head: Normocephalic.      Mouth/Throat:      Lips: Pink.      Mouth: Mucous membranes are moist.   Pulmonary:      Effort: Pulmonary effort is normal.   Abdominal:      Tenderness: There is no abdominal tenderness (per pt).   Neurological:      Mental Status: He is alert and oriented to person, place, and time.        Procedures     Assessment & Plan   Diagnoses and all orders for this visit:    1. Viral gastroenteritis (Primary)  -     promethazine (PHENERGAN) 25 MG tablet; Take 1 tablet by mouth Every 6 (Six) Hours As Needed for Nausea or Vomiting.   Dispense: 12 tablet; Refill: 0            PLAN: Discussed dosing, side effects, recommended other symptomatic care.  Patient should follow up with primary care provider, Urgent Care or ER if symptoms worsen, fail to resolve or other symptoms need attention. Patient/family agree to the above.         ARTUR Vasquez     The use of a video visit has been reviewed with the patient and verbal informed consent has been obtained. Myself and León Mar participated in this visit. The patient is located at [address] [place] [address]. I am located in [place], [place]. Mychart and Zoom were utilized.        This visit was performed via Telehealth.  This patient has been instructed to follow-up with their primary care provider if their symptoms worsen or the treatment provided does not resolve their illness.

## 2023-11-16 ENCOUNTER — TELEMEDICINE (OUTPATIENT)
Dept: FAMILY MEDICINE CLINIC | Facility: TELEHEALTH | Age: 38
End: 2023-11-16
Payer: COMMERCIAL

## 2023-11-16 DIAGNOSIS — K52.9 GASTROENTERITIS: Primary | ICD-10-CM

## 2023-11-16 RX ORDER — ONDANSETRON 4 MG/1
4 TABLET, ORALLY DISINTEGRATING ORAL EVERY 8 HOURS PRN
Qty: 15 TABLET | Refills: 0 | Status: SHIPPED | OUTPATIENT
Start: 2023-11-16 | End: 2023-11-23

## 2023-11-16 RX ORDER — LOPERAMIDE HYDROCHLORIDE 2 MG/1
2 TABLET ORAL 4 TIMES DAILY PRN
Qty: 15 TABLET | Refills: 0 | Status: SHIPPED | OUTPATIENT
Start: 2023-11-16

## 2023-11-16 NOTE — PATIENT INSTRUCTIONS
Viral Gastroenteritis, Adult    Viral gastroenteritis is also known as the stomach flu. This condition may affect your stomach, small intestine, and large intestine. It can cause sudden watery diarrhea, fever, and vomiting. This condition is caused by many different viruses. These viruses can be passed from person to person very easily (are contagious).  Diarrhea and vomiting can make you feel weak and cause you to become dehydrated. You may not be able to keep fluids down. Dehydration can make you tired and thirsty, cause you to have a dry mouth, and decrease how often you urinate. It is important to replace the fluids that you lose from diarrhea and vomiting.  What are the causes?  Gastroenteritis is caused by many viruses, including rotavirus and norovirus. Norovirus is the most common cause in adults. You can get sick after being exposed to the viruses from other people. You can also get sick by:  Eating food, drinking water, or touching a surface contaminated with one of these viruses.  Sharing utensils or other personal items with an infected person.  What increases the risk?  You are more likely to develop this condition if you:  Have a weak body defense system (immune system).  Live with one or more children who are younger than 2 years.  Live in a nursing home.  Travel on cruise ships.  What are the signs or symptoms?  Symptoms of this condition start suddenly 1-3 days after exposure to a virus. Symptoms may last for a few days or for as long as a week. Common symptoms include watery diarrhea and vomiting. Other symptoms include:  Fever.  Headache.  Fatigue.  Pain in the abdomen.  Chills.  Weakness.  Nausea.  Muscle aches.  Loss of appetite.  How is this diagnosed?  This condition is diagnosed with a medical history and physical exam. You may also have a stool test to check for viruses or other infections.  How is this treated?  This condition typically goes away on its own. The focus of treatment is to  prevent dehydration and restore lost fluids (rehydration). This condition may be treated with:  An oral rehydration solution (ORS) to replace important salts and minerals (electrolytes) in your body. Take this if told by your health care provider. This is a drink that is sold at pharmacies and retail stores.  Medicines to help with your symptoms.  Probiotic supplements to reduce symptoms of diarrhea.  Fluids given through an IV, if dehydration is severe.  Older adults and people with other diseases or a weak immune system are at higher risk for dehydration.  Follow these instructions at home:  Eating and drinking    Take an ORS as told by your health care provider.  Drink clear fluids in small amounts as you are able. Clear fluids include:  Water.  Ice chips.  Diluted fruit juice.  Low-calorie sports drinks.  Drink enough fluid to keep your urine pale yellow.  Eat small amounts of healthy foods every 3-4 hours as you are able. This may include whole grains, fruits, vegetables, lean meats, and yogurt.  Avoid fluids that contain a lot of sugar or caffeine, such as energy drinks, sports drinks, and soda.  Avoid spicy or fatty foods.  Avoid alcohol.  General instructions    Wash your hands often, especially after having diarrhea or vomiting. If soap and water are not available, use hand .  Make sure that all people in your household wash their hands well and often.  Take over-the-counter and prescription medicines only as told by your health care provider.  Rest at home while you recover.  Watch your condition for any changes.  Take a warm bath to relieve any burning or pain from frequent diarrhea episodes.  Keep all follow-up visits. This is important.  Contact a health care provider if you:  Cannot keep fluids down.  Have symptoms that get worse.  Have new symptoms.  Feel light-headed or dizzy.  Have muscle cramps.  Get help right away if you:  Have chest pain.  Have trouble breathing or you are breathing  very quickly.  Have a fast heartbeat.  Feel extremely weak or you faint.  Have a severe headache, a stiff neck, or both.  Have a rash.  Have severe pain, cramping, or bloating in your abdomen.  Have skin that feels cold and clammy.  Feel confused.  Have pain when you urinate.  Have signs of dehydration, such as:  Dark urine, very little urine, or no urine.  Cracked lips.  Dry mouth.  Sunken eyes.  Sleepiness.  Weakness.  Have signs of bleeding, such as:  Seeing blood in your vomit.  Having vomit that looks like coffee grounds.  Having bloody or black stools or stools that look like tar.  These symptoms may be an emergency. Get help right away. Call 911.  Do not wait to see if the symptoms will go away.  Do not drive yourself to the hospital.  Summary  Viral gastroenteritis is also known as the stomach flu. It can cause sudden watery diarrhea, fever, and vomiting.  This condition can be passed from person to person very easily (is contagious).  Take an oral rehydration solution (ORS) if told by your health care provider. This is a drink that is sold at pharmacies and retail stores.  Wash your hands often, especially after having diarrhea or vomiting. If soap and water are not available, use hand .  This information is not intended to replace advice given to you by your health care provider. Make sure you discuss any questions you have with your health care provider.  Document Revised: 10/17/2022 Document Reviewed: 10/17/2022  [employer] Patient Education © 2023 [employer]

## 2023-11-16 NOTE — LETTER
November 16, 2023     Patient: León Mar   YOB: 1985   Date of Visit: 11/16/2023       To Whom It May Concern:    It is my medical opinion that León Mar may return to work 11/17/2023.            Sincerely,        ARTUR Nelson    CC: No Recipients

## 2023-11-16 NOTE — PROGRESS NOTES
Chief Complaint   Patient presents with    Diarrhea    Nausea       Video Visit Reason:   Free Text Description: I have had diarrhea for days  Subjective   León Mar is a 38 y.o. male.     History of Present Illness  Nausea and diarrhea since yesterday. 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 He thinks that he is staying sick a lot due to this. He has not fever, chills or abdominal pain. Diarrhea is several times since yesterday. He has not taken anything. He has changed his diet.  Diarrhea   This is a new problem. The current episode started yesterday. The problem has been gradually worsening. The stool consistency is described as Watery. Pertinent negatives include no abdominal pain, chills, fever, headaches or vomiting. Risk factors include ill contacts. He has tried change of diet for the symptoms.   Nausea  This is a new problem. The current episode started yesterday. The problem has been gradually worsening. Associated symptoms include a change in bowel habit and nausea. Pertinent negatives include no abdominal pain, chills, fever, headaches, urinary symptoms or vomiting.       The following portions of the patient's history were reviewed and updated as appropriate: allergies, current medications, past medical history, and problem list.      Past Medical History:   Diagnosis Date    Heart attack 2009     Social History     Socioeconomic History    Marital status: Single    Number of children: 1    Years of education: 12   Tobacco Use    Smoking status: Every Day     Packs/day: 0.50     Years: 16.00     Additional pack years: 0.00     Total pack years: 8.00     Types: Cigarettes    Smokeless tobacco: Never   Vaping Use    Vaping Use: Never used   Substance and Sexual Activity    Alcohol use: No    Drug use: No    Sexual activity: Yes     Partners: Female     medication documentation: reviewed and updated with patient and   Current Outpatient Medications:      atorvastatin (LIPITOR) 20 MG tablet, Take 1 tablet by mouth Daily., Disp: 30 tablet, Rfl: 0    gabapentin (NEURONTIN) 300 MG capsule, Take 1 capsule by mouth 3 (Three) Times a Day., Disp: 60 capsule, Rfl: 2    losartan (COZAAR) 25 MG tablet, Take 1 tablet by mouth Every Night., Disp: 30 tablet, Rfl: 0    loperamide (Imodium A-D) 2 MG tablet, Take 1 tablet by mouth 4 (Four) Times a Day As Needed for Diarrhea., Disp: 15 tablet, Rfl: 0    ondansetron ODT (ZOFRAN-ODT) 4 MG disintegrating tablet, Place 1 tablet on the tongue Every 8 (Eight) Hours As Needed for Nausea or Vomiting for up to 7 days., Disp: 15 tablet, Rfl: 0  Review of Systems   Constitutional:  Negative for chills and fever.   Gastrointestinal:  Positive for change in bowel habit, diarrhea and nausea. Negative for abdominal pain and vomiting.   Neurological:  Negative for headaches.       Objective   Physical Exam  Constitutional:       General: He is not in acute distress.     Appearance: He is not ill-appearing.   HENT:      Nose: Nose normal.   Eyes:      Conjunctiva/sclera: Conjunctivae normal.   Pulmonary:      Effort: Pulmonary effort is normal.   Neurological:      Mental Status: He is alert.   Psychiatric:         Speech: Speech normal.         Assessment & Plan   Diagnoses and all orders for this visit:    1. Gastroenteritis (Primary)  -     ondansetron ODT (ZOFRAN-ODT) 4 MG disintegrating tablet; Place 1 tablet on the tongue Every 8 (Eight) Hours As Needed for Nausea or Vomiting for up to 7 days.  Dispense: 15 tablet; Refill: 0  -     loperamide (Imodium A-D) 2 MG tablet; Take 1 tablet by mouth 4 (Four) Times a Day As Needed for Diarrhea.  Dispense: 15 tablet; Refill: 0                  Follow Up:  If your symptoms are not resolving in 5-7 days or are worsening, see your primary care provider for follow up. If you don't have a primary care provider, you may go to any Urgent Care for re-evaluation. If you develop any life threatening symptoms, go  to the nearest Emergency Department immediately or call EMS.               The use of  Video Visit was utilized during this visit, using both V I O and [employer]. The use of a video visit has been reviewed with the patient and verbal informed consent has been obtained. No technical difficulties occurred during the visit.    is located at [address] [place] [address]  Provider is located at [place], [place]

## 2024-04-06 ENCOUNTER — TELEMEDICINE (OUTPATIENT)
Dept: FAMILY MEDICINE CLINIC | Facility: TELEHEALTH | Age: 39
End: 2024-04-06
Payer: COMMERCIAL

## 2024-04-06 DIAGNOSIS — K52.9 ACUTE GASTROENTERITIS: Primary | ICD-10-CM

## 2024-04-06 RX ORDER — ONDANSETRON 4 MG/1
4 TABLET, FILM COATED ORAL EVERY 8 HOURS PRN
COMMUNITY

## 2024-04-06 NOTE — PROGRESS NOTES
"CHIEF COMPLAINT  Chief Complaint   Patient presents with    GI Problem         HPI  León Mar is a 38 y.o. male  presents with complaint of \"I work at [employer] and my manager was sick so worked with her she had a fever and cough and dhirra and I am now having the same issue.\"   He is having fever, diarrhea and HA.     Review of Systems   Constitutional:  Positive for chills, fatigue and fever. Negative for diaphoresis.   HENT:  Negative for congestion, postnasal drip, rhinorrhea, sinus pressure, sinus pain, sneezing and sore throat.    Respiratory:  Negative for cough.    Gastrointestinal:  Positive for diarrhea and nausea. Negative for abdominal distention, abdominal pain, anal bleeding, blood in stool, constipation, rectal pain and vomiting.   Neurological:  Positive for headaches.   Hematological:  Negative for adenopathy.       Past Medical History:   Diagnosis Date    Heart attack 2009       No family history on file.    Social History     Socioeconomic History    Marital status: Single    Number of children: 1    Years of education: 12   Tobacco Use    Smoking status: Every Day     Current packs/day: 0.50     Average packs/day: 0.5 packs/day for 16.0 years (8.0 ttl pk-yrs)     Types: Cigarettes     Passive exposure: Never    Smokeless tobacco: Never    Tobacco comments:     Patient is aware of health consequences of smoking.    Vaping Use    Vaping status: Never Used   Substance and Sexual Activity    Alcohol use: No    Drug use: No    Sexual activity: Yes     Partners: Female         There were no vitals taken for this visit.    PHYSICAL EXAM  Physical Exam   Constitutional: He is oriented to person, place, and time. He appears well-developed and well-nourished. He does not have a sickly appearance. He does not appear ill. No distress.   HENT:   Head: Normocephalic and atraumatic.   Eyes: EOM are normal.   Neck: Neck normal appearance.  Pulmonary/Chest: Effort normal.  No respiratory distress.  Neurological: " He is alert and oriented to person, place, and time.   Skin: Skin is dry.   Psychiatric: He has a normal mood and affect.           Diagnoses and all orders for this visit:    1. 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     The use of a video visit has been reviewed with the patient and verbal informed consent has been obtained. Myself and León Mar participated in this visit. The patient is located in [address] [place] [address]. I am located in [place], [place]. [employer] and [employer] were utilized.       Note Disclaimer: At [facility], we believe that sharing information builds trust and better   relationships. You are receiving this note because you recently visited [facility]. It is possible you   will see health information before a provider has talked with you about it. This kind of information can   be easy to misunderstand. To help you fully understand what it means for your health, we urge you to   discuss this note with your provider.    Taina Duncan, ARTUR  04/06/2024  12:52 EDT

## 2024-04-06 NOTE — PATIENT INSTRUCTIONS
Drink plenty of water  Clear liquid diet until no longer vomiting. Stay away from fatty and fried foods and milk products until diarrhea resolves.   If symptoms do not improve in 3 days follow up with your primary care provider or urgent care  If you develop abdominal pain or more severe symptoms, go to the emergency department.     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 You will need to go to urgent care or the emergency department if symptoms worsen, you develo a high fever, you cannot keep food down, or symptoms last longer than 3-4 days.        Viral Gastroenteritis, Adult  Viral gastroenteritis is also known as the stomach flu. This condition may affect your stomach, your small intestine, and your large intestine. It can cause sudden watery poop (diarrhea), fever, and vomiting. This condition is caused by certain germs (viruses). These germs can be passed from person to person very easily (are contagious).  Having watery poop and vomiting can make you feel weak and cause you to not have enough water in your body (get dehydrated). This can make you tired and thirsty, make you have a dry mouth, and make it so you pee (urinate) less often. It is important to replace the fluids that you lose from having watery poop and vomiting.  What are the causes?  You can get sick by catching germs from other people.  You can also get sick by:  Eating food, drinking water, or touching a surface that has the germs on it (is contaminated).  Sharing utensils or other personal items with a person who is sick.  What increases the risk?  Having a weak body defense system (immune system).  Living with one or more children who are younger than 2 years.  Living in a nursing home.  Going on cruise ships.  What are the signs or symptoms?  Symptoms of this condition start suddenly. Symptoms may last for a few days or for as long as a week.  Common symptoms include:  Watery poop.  Vomiting.  Other symptoms include:  Fever.  Headache.  Feeling tired  (fatigue).  Pain in the belly (abdomen).  Chills.  Feeling weak.  Feeling like you may vomit (nauseous).  Muscle aches.  Not feeling hungry.  How is this treated?  This condition typically goes away on its own. The focus of treatment is to replace the fluids that you lose. This condition may be treated with:  An ORS (oral rehydration solution). This is a drink that helps you replace fluids and minerals your body lost. It is sold at pharmacies and stores.  Medicines to help with your symptoms.  Probiotic supplements to reduce symptoms of watery poop.  Fluids given through an IV tube, if needed.  Older adults and people with other diseases or a weak body defense system are at higher risk for not having enough water in the body.  Follow these instructions at home:  Eating and drinking  Take an ORS as told by your doctor.  Drink clear fluids in small amounts as you are able. Clear fluids include:  Water.  Ice chips.  Fruit juice that has water added to it (is diluted).  Low-calorie sports drinks.  Drink enough fluid to keep your pee (urine) pale yellow.  Eat small amounts of healthy foods every 3-4 hours as you are able. This may include whole grains, fruits, vegetables, lean meats, and yogurt.  Avoid fluids that have a lot of sugar or caffeine in them. This includes energy drinks, sports drinks, and soda.  Avoid spicy or fatty foods.  Avoid alcohol.  General instructions  Wash your hands often. This is very important after you have watery poop or you vomit. If you cannot use soap and water, use hand .  Make sure that all people in your home wash their hands well and often.  Take over-the-counter and prescription medicines only as told by your doctor.  Rest at home while you get better.  Watch your condition for any changes.  Take a warm bath to help with any burning or pain from having watery poop.  Keep all follow-up visits.  Contact a doctor if:  You cannot keep fluids down.  Your symptoms get worse.  You  have new symptoms.  You feel light-headed or dizzy.  You have muscle cramps.  Get help right away if:  You have chest pain.  You have trouble breathing, or you are breathing very fast.  You have a fast heartbeat.  You feel very weak or you faint.  You have a very bad headache, a stiff neck, or both.  You have a rash.  You have very bad pain, cramping, or bloating in your belly.  Your skin feels cold and clammy.  You feel mixed up (confused).  You have pain when you pee.  You have signs of not having enough water in the body, such as:  Dark pee, hardly any pee, or no pee.  Cracked lips.  Dry mouth.  Sunken eyes.  Feeling very sleepy.  Feeling weak.  You have signs of bleeding, such as:  You see blood in your vomit.  Your vomit looks like coffee grounds.  You have bloody or black poop or poop that looks like tar.  These symptoms may be an emergency. Get help right away. Call 911.  Do not wait to see if the symptoms will go away.  Do not drive yourself to the hospital.  Summary  Viral gastroenteritis is also known as the stomach flu.  This condition can cause sudden watery poop (diarrhea), fever, and vomiting.  These germs can be passed from person to person very easily.  Take an ORS (oral rehydration solution) as told by your doctor. This is a drink that is sold at pharmacies and stores.  Wash your hands often, especially after having watery poop or vomiting. If you cannot use soap and water, use hand .  This information is not intended to replace advice given to you by your health care provider. Make sure you discuss any questions you have with your health care provider.  Document Revised: 10/17/2022 Document Reviewed: 10/17/2022  [employer]vier Patient Education © 2023 [employer]

## 2024-04-06 NOTE — LETTER
April 6, 2024     Patient: León Mar   YOB: 1985   Date of Visit: 4/6/2024       To Whom It May Concern:    It is my medical opinion that León Mar may return to work on Tuesday 4/9/2024.       Sincerely,    ARTUR Escobar     URGENT CARE VIDEO VISIT PROVIDER    CC: No Recipients

## 2024-04-10 ENCOUNTER — TELEMEDICINE (OUTPATIENT)
Dept: FAMILY MEDICINE CLINIC | Facility: TELEHEALTH | Age: 39
End: 2024-04-10
Payer: COMMERCIAL

## 2024-04-10 DIAGNOSIS — J06.9 ACUTE URI: Primary | ICD-10-CM

## 2024-04-10 RX ORDER — PREDNISONE 20 MG/1
20 TABLET ORAL 2 TIMES DAILY
Qty: 10 TABLET | Refills: 0 | Status: SHIPPED | OUTPATIENT
Start: 2024-04-10 | End: 2024-04-15

## 2024-04-10 RX ORDER — AZITHROMYCIN 250 MG/1
TABLET, FILM COATED ORAL
Qty: 6 TABLET | Refills: 0 | Status: SHIPPED | OUTPATIENT
Start: 2024-04-10

## 2024-04-10 NOTE — LETTER
[facility]  [facility]  [address]    [facility] [address]  Phone: [phone]  Fax: [phone]    León Mar was seen and treated in our Urgent Care on 4/10/2024.  He may return to work on 4/11/24.      .        Thank you for choosing [facility].    ARTUR Whelan

## 2024-04-10 NOTE — PROGRESS NOTES
You have chosen to receive care through a telehealth visit.  Do you consent to use a video/audio connection for your medical care today? Yes     CHIEF COMPLAINT  Chief Complaint   Patient presents with    Cough    Nasal Congestion         HPI  León Mar is a 38 y.o. male  presents with complaint of nasal congestion, scratchy throat and cough that started yesterday.     Review of Systems   HENT:  Positive for congestion and postnasal drip.    Respiratory:  Positive for cough.    All other systems reviewed and are negative.      Past Medical History:   Diagnosis Date    Heart attack 2009       History reviewed. No pertinent family history.    Social History     Socioeconomic History    Marital status: Single    Number of children: 1    Years of education: 12   Tobacco Use    Smoking status: Every Day     Current packs/day: 0.50     Average packs/day: 0.5 packs/day for 16.0 years (8.0 ttl pk-yrs)     Types: Cigarettes     Passive exposure: Never    Smokeless tobacco: Never    Tobacco comments:     Patient is aware of health consequences of smoking.    Vaping Use    Vaping status: Never Used   Substance and Sexual Activity    Alcohol use: No    Drug use: No    Sexual activity: Yes     Partners: Female       León Mar  reports that he has been smoking cigarettes. He has a 8 pack-year smoking history. He has never been exposed to tobacco smoke. He has never used smokeless tobacco. I have educated him on the risk of diseases from using tobacco products such as cancer, COPD, and heart disease.     I advised him to quit and he is not willing to quit.    I spent  1  minutes counseling the patient.              There were no vitals taken for this visit.    PHYSICAL EXAM  Physical Exam   Constitutional: He appears well-developed and well-nourished.   HENT:   Head: Normocephalic.   Eyes: Pupils are equal, round, and reactive to light.   Pulmonary/Chest: Effort normal.   Musculoskeletal: Normal range of motion.   Neurological:  He is alert.   Psychiatric: He has a normal mood and affect.       Results for orders placed or performed in visit on 09/16/22   Comprehensive Metabolic Panel    Specimen: Arm, Left; Blood   Result Value Ref Range    Glucose 72 65 - 99 mg/dL    BUN 11 6 - 20 mg/dL    Creatinine 0.93 0.76 - 1.27 mg/dL    Sodium 143 136 - 145 mmol/L    Potassium 4.2 3.5 - 5.2 mmol/L    Chloride 106 98 - 107 mmol/L    CO2 29.3 (H) 22.0 - 29.0 mmol/L    Calcium 9.3 8.6 - 10.5 mg/dL    Total Protein 7.5 6.0 - 8.5 g/dL    Albumin 4.80 3.50 - 5.20 g/dL    ALT (SGPT) 20 1 - 41 U/L    AST (SGOT) 17 1 - 40 U/L    Alkaline Phosphatase 88 39 - 117 U/L    Total Bilirubin 0.4 0.0 - 1.2 mg/dL    Globulin 2.7 gm/dL    A/G Ratio 1.8 g/dL    BUN/Creatinine Ratio 11.8 7.0 - 25.0    Anion Gap 7.7 5.0 - 15.0 mmol/L    eGFR 109.8 >60.0 mL/min/1.73   Lipid Panel    Specimen: Arm, Left; Blood   Result Value Ref Range    Total Cholesterol 188 0 - 200 mg/dL    Triglycerides 217 (H) 0 - 150 mg/dL    HDL Cholesterol 34 (L) 40 - 60 mg/dL    LDL Cholesterol  116 (H) 0 - 100 mg/dL    VLDL Cholesterol 38 5 - 40 mg/dL    LDL/HDL Ratio 3.25    TSH    Specimen: Arm, Left; Blood   Result Value Ref Range    TSH 1.260 0.270 - 4.200 uIU/mL   Hemoglobin A1c    Specimen: Arm, Left; Blood   Result Value Ref Range    Hemoglobin A1C 4.60 (L) 4.80 - 5.60 %   Vitamin D 25 Hydroxy    Specimen: Arm, Left; Blood   Result Value Ref Range    25 Hydroxy, Vitamin D 34.3 30.0 - 100.0 ng/ml   Vitamin B12    Specimen: Arm, Left; Blood   Result Value Ref Range    Vitamin B-12 254 211 - 946 pg/mL   CBC Auto Differential    Specimen: Arm, Left; Blood   Result Value Ref Range    WBC 6.33 3.40 - 10.80 10*3/mm3    RBC 5.20 4.14 - 5.80 10*6/mm3    Hemoglobin 16.3 13.0 - 17.7 g/dL    Hematocrit 47.9 37.5 - 51.0 %    MCV 92.1 79.0 - 97.0 fL    MCH 31.3 26.6 - 33.0 pg    MCHC 34.0 31.5 - 35.7 g/dL    RDW 13.0 12.3 - 15.4 %    RDW-SD 43.6 37.0 - 54.0 fl    MPV 11.6 6.0 - 12.0 fL    Platelets 176  140 - 450 10*3/mm3    Neutrophil % 56.6 42.7 - 76.0 %    Lymphocyte % 34.3 19.6 - 45.3 %    Monocyte % 6.3 5.0 - 12.0 %    Eosinophil % 2.1 0.3 - 6.2 %    Basophil % 0.5 0.0 - 1.5 %    Immature Grans % 0.2 0.0 - 0.5 %    Neutrophils, Absolute 3.59 1.70 - 7.00 10*3/mm3    Lymphocytes, Absolute 2.17 0.70 - 3.10 10*3/mm3    Monocytes, Absolute 0.40 0.10 - 0.90 10*3/mm3    Eosinophils, Absolute 0.13 0.00 - 0.40 10*3/mm3    Basophils, Absolute 0.03 0.00 - 0.20 10*3/mm3    Immature Grans, Absolute 0.01 0.00 - 0.05 10*3/mm3    nRBC 0.0 0.0 - 0.2 /100 WBC       Diagnoses and all orders for this visit:    1. Acute URI (Primary)  -     predniSONE (DELTASONE) 20 MG tablet; Take 1 tablet by mouth 2 (Two) Times a Day for 5 days.  Dispense: 10 tablet; Refill: 0  -     azithromycin (Zithromax Z-Kenton) 250 MG tablet; Take 2 tablets by mouth on day 1, then 1 tablet daily on days 2-5  Dispense: 6 tablet; Refill: 0          FOLLOW-UP  As discussed during visit with PCP/[facility] if no improvement or Urgent Care/Emergency Department if worsening of symptoms    Patient verbalizes understanding of medication dosage, comfort measures, instructions for treatment and follow-up.    ARTUR Victor  04/10/2024  05:46 EDT    The use of a video visit has been reviewed with the patient and verbal informed consent has been obtained. Myself and León Mar participated in this visit. The patient is located in [address] [place] [address].    I am located in [place], [place]. Mychart and Twilio were utilized. I spent 10 minutes in the patient's chart for this visit.      Note Disclaimer: At [facility], we believe that sharing information builds trust and better   relationships. You are receiving this note because you recently visited [facility]. It is possible you   will see health information before a provider has talked with you about it. This kind of information can   be easy to misunderstand. To help you fully understand what it means  for your health, we urge you to   discuss this note with your provider.